# Patient Record
Sex: MALE | Race: WHITE
[De-identification: names, ages, dates, MRNs, and addresses within clinical notes are randomized per-mention and may not be internally consistent; named-entity substitution may affect disease eponyms.]

---

## 2018-10-12 ENCOUNTER — HOSPITAL ENCOUNTER (EMERGENCY)
Dept: HOSPITAL 61 - ER | Age: 43
Discharge: HOME | End: 2018-10-12
Payer: COMMERCIAL

## 2018-10-12 VITALS — BODY MASS INDEX: 22.9 KG/M2 | HEIGHT: 70 IN | WEIGHT: 160 LBS

## 2018-10-12 VITALS — SYSTOLIC BLOOD PRESSURE: 159 MMHG | DIASTOLIC BLOOD PRESSURE: 80 MMHG

## 2018-10-12 DIAGNOSIS — M79.10: ICD-10-CM

## 2018-10-12 DIAGNOSIS — Z87.442: ICD-10-CM

## 2018-10-12 DIAGNOSIS — R68.83: Primary | ICD-10-CM

## 2018-10-12 DIAGNOSIS — R05: ICD-10-CM

## 2018-10-12 DIAGNOSIS — R51: ICD-10-CM

## 2018-10-12 DIAGNOSIS — E10.9: ICD-10-CM

## 2018-10-12 DIAGNOSIS — R11.0: ICD-10-CM

## 2018-10-12 LAB
INFLUENZA A PATIENT: NEGATIVE
INFLUENZA B PATIENT: NEGATIVE

## 2018-10-12 PROCEDURE — 87804 INFLUENZA ASSAY W/OPTIC: CPT

## 2018-10-12 PROCEDURE — 99284 EMERGENCY DEPT VISIT MOD MDM: CPT

## 2018-10-12 PROCEDURE — 82962 GLUCOSE BLOOD TEST: CPT

## 2018-10-12 NOTE — PHYS DOC
Past Medical History


Past Medical History:  Diabetes-Type I, Kidney Stone


Past Surgical History:  Other


Additional Past Surgical Histo:  knee, shoulder


Alcohol Use:  None


Drug Use:  None





Adult General


Chief Complaint


Chief Complaint:  FLU SYMPTOM





HPI


HPI





43-year-old male presents to ER with complaints of 3 day history of generalized 

body aches, chills, nausea, and nonproductive cough. Patient denies any other 

family members being ill or been around ill people in the past 1-2 weeks. 

Patient denies any recent travel. Patient reports he is type I diabetic denying 

any polyuria or polydipsia. Patient's blood sugar is currently 155. Patient 

reports he has been taking ibuprofen 400 mg with last dose at 10:15 this 

morning with some improvement in symptoms. He has had slight decrease in 

appetite but has been drinking water. Patient denies any urinary symptoms.





Review of Systems


Review of Systems





Constitutional: Denies fever. Ports chills and generalized body aches


Eyes: Denies change in visual acuity, redness, or eye pain []


HENT: Denies nasal congestion or sore throat []


Respiratory: Denies shortness of breath. Reports intermittent nonproductive 

cough


Cardiovascular: Denies chest pain or palpitations


GI: Denies abdominal pain, vomiting, bloody stools or diarrhea. Reports 

intermittent nausea


: Denies dysuria or hematuria []


Musculoskeletal: Denies back/neck pain or joint pain []


Integument: Denies rash, swelling, or skin lesions []


Neurologic: Denies focal weakness or sensory changes. Reports mild headache 

denies dizziness or lightheadedness


Endocrine: Denies polyuria or polydipsia []





All other systems were reviewed and found to be within normal limits, except as 

documented in this note.





Current Medications


Current Medications





Current Medications








 Medications


  (Trade)  Dose


 Ordered  Sig/[facility]  Start Time


 Stop Time Status Last Admin


Dose Admin


 


 Acetaminophen


  (Tylenol)  1,000 mg  1X  ONCE  10/12/18 13:00


 10/12/18 13:01 DC 10/12/18 12:53


1,000 MG











Allergies


Allergies





Allergies








Coded Allergies Type Severity Reaction Last Updated Verified


 


  No Known Drug Allergies    10/12/18 No











Physical Exam


Physical Exam





Constitutional: Well developed, well nourished, no acute distress, non-toxic 

appearance. []


HENT: Normocephalic, atraumatic, bilateral ears normal, mucous membranes pink/

moist, no oral exudates, nose normal. []


Eyes: PERRLA, no nystagmus, conjunctiva normal, no discharge. [] 


Neck: Normal range of motion, no tenderness, supple, no gross adenopathy


Cardiovascular:Heart rate regular rhythm, no murmur []


Lungs & Thorax:  Bilateral breath sounds clear to auscultation. Resp. equal/

nonlabored


Abdomen: Bowel sounds normal, soft, no tenderness, no masses, no pulsatile 

masses. [] 


Skin: Warm, dry, no erythema, no rash. [] 


Back: No tenderness, no CVA tenderness. [] 


Extremities: No tenderness, no cyanosis, no clubbing, ROM intact, no edema. [] 


Neurologic: Alert and oriented X 3, normal motor function, normal sensory 

function, no focal deficits noted. []


Psychologic: Affect normal, judgement normal, mood normal. []





Current Patient Data


Vital Signs





 Vital Signs








  Date Time  Temp Pulse Resp B/P (MAP) Pulse Ox O2 Delivery O2 Flow Rate FiO2


 


10/12/18 12:34 98.4 94 20 159/80 (106) 97 Room Air  





 98.4       








Lab Values





 Laboratory Tests








Test


 10/12/18


12:25 10/12/18


12:31


 


Influenza Type A Antigen


 Negative


(NEGATIVE) 





 


Influenza Type B Antigen


 Negative


(NEGATIVE) 





 


Glucose (Fingerstick)


 


 155 mg/dL


(70-99)  H











EKG


EKG


[]





Radiology/Procedures


Radiology/Procedures


[]





Course & Med Decision Making


Course & Med Decision Making


Pertinent Labs studies reviewed. (See chart for details)





Discussed negative influenza test with patient. Patient was given gram of 

Tylenol while in the ER. Recheck of temperature he has 98.3 as he reports he is 

feeling warmer than he did at time of arrival. Patient remains nontoxic in 

appearance and in no visible distress. Discussed plans for home discharge and 

education regarding viral type illness with options for over-the-counter 

treatment including Tylenol and/or ibuprofen. Patient encouraged to increase 

fluid intake, eat well-balanced meal, and get plenty of rest. Will provide 

community clinic and physician referral information for follow-up purposes. 

Education provided on signs and symptoms to return to ER for an discharge 

instructions were discussed. Pt encouraged to continue monitoring his BS at 

home along with ketones with any V/D or change in condition.





Dragon Disclaimer


Dragon Disclaimer


This electronic medical record was generated, in whole or in part, using a 

voice recognition dictation system.





Departure


Departure


Impression:  


 Primary Impression:  


 Flu-like symptoms


Disposition:  01 HOME, SELF-CARE


Condition:  STABLE


Patient Instructions:  Viral Syndrome





Additional Instructions:  


Your symptoms are probably due to viral illness and is flu like in nature. If 

symptoms worsen follow-up with primary doctor in next 2-3 days. 





Plenty of fluids, well balanced meal, and get plenty of rest.





Tylenol and/or ibuprofen as directed on container as needed.











JAMEE GLOVER Oct 12, 2018 13:02

## 2018-10-13 ENCOUNTER — HOSPITAL ENCOUNTER (EMERGENCY)
Dept: HOSPITAL 61 - ER | Age: 43
Discharge: HOME | End: 2018-10-13
Payer: COMMERCIAL

## 2018-10-13 VITALS — BODY MASS INDEX: 23.62 KG/M2 | HEIGHT: 70 IN | WEIGHT: 165 LBS

## 2018-10-13 VITALS — SYSTOLIC BLOOD PRESSURE: 170 MMHG | DIASTOLIC BLOOD PRESSURE: 82 MMHG

## 2018-10-13 DIAGNOSIS — L02.31: Primary | ICD-10-CM

## 2018-10-13 DIAGNOSIS — Z98.52: ICD-10-CM

## 2018-10-13 DIAGNOSIS — Z87.442: ICD-10-CM

## 2018-10-13 DIAGNOSIS — E10.9: ICD-10-CM

## 2018-10-13 PROCEDURE — 96372 THER/PROPH/DIAG INJ SC/IM: CPT

## 2018-10-13 PROCEDURE — 99284 EMERGENCY DEPT VISIT MOD MDM: CPT

## 2018-10-13 NOTE — PHYS DOC
Past Medical History


Past Medical History:  Diabetes-Type I, Kidney Stone


Past Surgical History:  Other


Additional Past Surgical Histo:  knee, shoulder; vasectomy


Alcohol Use:  None


Drug Use:  None





Adult General


Chief Complaint


Chief Complaint:  ABSCESS





HPI


HPI





Patient is a 43  year old male who presents with an abscess to his buttock 1 

day. The she was seen at this facility yesterday for flulike symptoms. He was 

not found to have influenza. He states that after he left the hospital he went 

home and was having some constipation issues. He states that when he sat down 

on the toilet he discovered that he had the painful swelling to his right 

buttock. He denies any history of abscess. He states that he has had a headache 

today but denies fever, nausea or vomiting.





Review of Systems


Review of Systems





Constitutional: Denies fever or chills []


Respiratory: Denies cough or shortness of breath []


Cardiovascular: No additional information not addressed in HPI []


GI: Denies abdominal pain, nausea, vomiting, bloody stools or diarrhea []


: Denies dysuria or hematuria []


Musculoskeletal: Denies back pain or joint pain []


Integument: See history of present illness


Neurologic: Denies headache, focal weakness or sensory changes []


Endocrine: Denies polyuria or polydipsia []





All other systems were reviewed and found to be within normal limits, except as 

documented in this note.





Current Medications


Current Medications





Current Medications








 Medications


  (Trade)  Dose


 Ordered  Sig/[facility]  Start Time


 Stop Time Status Last Admin


Dose Admin


 


 Acetaminophen/


 Hydrocodone Bitart


  (Lortab 5/325)  1 tab  1X  ONCE  10/13/18 14:15


 10/13/18 14:16 DC 10/13/18 14:32


1 TAB


 


 Ceftriaxone Sodium


  (Rocephin Im)  1 gm  STK-MED ONCE  10/13/18 14:43


 10/13/18 14:44 DC  





 


 Lidocaine/Sodium


 Bicarbonate


  (Buffered


 Lidocaine 1%)  3 ml  1X  ONCE  10/13/18 14:15


 10/13/18 14:16 DC 10/13/18 14:32


3 ML











Allergies


Allergies





Allergies








Coded Allergies Type Severity Reaction Last Updated Verified


 


  No Known Drug Allergies    10/12/18 No











Physical Exam


Physical Exam





Constitutional: Well developed, well nourished, no acute distress, non-toxic 

appearance. []


Cardiovascular:Heart rate regular rhythm, no murmur []


Lungs & Thorax:  Bilateral breath sounds clear to auscultation []


Abdomen: Bowel sounds normal, soft, no tenderness, no masses, no pulsatile 

masses. [] 


Skin: There is a firm erythematous abscess to the lower 8 gluteal fold with 

induration but no fluctuance noted


Back: No tenderness, no CVA tenderness. [] 


Extremities: No tenderness, no cyanosis, no clubbing, ROM intact, no edema. [] 


Neurologic: Alert and oriented X 3, normal motor function, normal sensory 

function, no focal deficits noted. []


Psychologic: Affect normal, judgement normal, mood normal. []





Current Patient Data


Vital Signs





 Vital Signs








  Date Time  Temp Pulse Resp B/P (MAP) Pulse Ox O2 Delivery O2 Flow Rate FiO2


 


10/13/18 14:32   16  99 Room Air  


 


10/13/18 14:12 98.1 95  170/82 (111)    





 98.1       











EKG


EKG


[]





Radiology/Procedures


Radiology/Procedures


[]





Course & Med Decision Making


Course & Med Decision Making


Pertinent Labs and Imaging studies reviewed. (See chart for details)





[]The patient is being placed on antibiotics. He is to use hot compresses 

multiple times daily. He is in agreement with this plan. He was given a gram of 

Rocephin in the emergency department as well. He is to return immediately if 

worsening.





Dragon Disclaimer


Dragon Disclaimer


This electronic medical record was generated, in whole or in part, using a 

voice recognition dictation system.





Departure


Departure


Impression:  


 Primary Impression:  


 Abscess


Disposition:  01 HOME, SELF-CARE


Condition:  STABLE


Referrals:  


NO PCP (PCP)


Patient Instructions:  Abscess





Additional Instructions:  


Take the medication as directed. Do not drive or operate heavy machinery while 

taking pain medication. Follow-up with primary care in 3 days if not improving 

or return to the emergency department if worsening. Use hot compresses multiple 

times daily to the abscess.


Scripts


Hydrocodone/Apap 5-325 (NORCO 5-325 TABLET) 1 Each Tablet


1 TAB PO PRN Q6HRS PRN for PAIN, #14 TAB 0 Refills


   Prov: TAVON SEYMOUR         10/13/18 


Sulfamethoxazole/Trimethoprim (BACTRIM DS TABLET) 1 Each Tablet


1 TAB PO BID, #20 TAB


   Prov: TAVON SEYMOUR         10/13/18











TAVON SEYMOUR Oct 13, 2018 14:44

## 2018-10-14 ENCOUNTER — HOSPITAL ENCOUNTER (INPATIENT)
Dept: HOSPITAL 61 - ER | Age: 43
LOS: 7 days | Discharge: HOME | DRG: 854 | End: 2018-10-21
Attending: INTERNAL MEDICINE | Admitting: INTERNAL MEDICINE
Payer: COMMERCIAL

## 2018-10-14 VITALS — HEIGHT: 70 IN | BODY MASS INDEX: 23.62 KG/M2 | WEIGHT: 165 LBS

## 2018-10-14 VITALS — DIASTOLIC BLOOD PRESSURE: 78 MMHG | SYSTOLIC BLOOD PRESSURE: 148 MMHG

## 2018-10-14 VITALS — DIASTOLIC BLOOD PRESSURE: 79 MMHG | SYSTOLIC BLOOD PRESSURE: 145 MMHG

## 2018-10-14 VITALS — SYSTOLIC BLOOD PRESSURE: 138 MMHG | DIASTOLIC BLOOD PRESSURE: 73 MMHG

## 2018-10-14 DIAGNOSIS — E11.65: ICD-10-CM

## 2018-10-14 DIAGNOSIS — Z87.442: ICD-10-CM

## 2018-10-14 DIAGNOSIS — Z83.3: ICD-10-CM

## 2018-10-14 DIAGNOSIS — N39.0: ICD-10-CM

## 2018-10-14 DIAGNOSIS — K62.89: ICD-10-CM

## 2018-10-14 DIAGNOSIS — A41.01: Primary | ICD-10-CM

## 2018-10-14 DIAGNOSIS — Z79.899: ICD-10-CM

## 2018-10-14 DIAGNOSIS — I10: ICD-10-CM

## 2018-10-14 DIAGNOSIS — N20.0: ICD-10-CM

## 2018-10-14 DIAGNOSIS — Z79.4: ICD-10-CM

## 2018-10-14 DIAGNOSIS — Z82.49: ICD-10-CM

## 2018-10-14 DIAGNOSIS — L03.315: ICD-10-CM

## 2018-10-14 DIAGNOSIS — K61.1: ICD-10-CM

## 2018-10-14 DIAGNOSIS — R74.0: ICD-10-CM

## 2018-10-14 DIAGNOSIS — Z87.440: ICD-10-CM

## 2018-10-14 DIAGNOSIS — E11.649: ICD-10-CM

## 2018-10-14 LAB
% BANDS: 4 % (ref 0–9)
% LYMPHS: 9 % (ref 24–48)
% MONOS: 5 % (ref 0–10)
% SEGS: 80 % (ref 35–66)
ALBUMIN SERPL-MCNC: 3.2 G/DL (ref 3.4–5)
ALBUMIN/GLOB SERPL: 0.8 {RATIO} (ref 1–1.7)
ALP SERPL-CCNC: 101 U/L (ref 46–116)
ALT SERPL-CCNC: 38 U/L (ref 16–63)
ANION GAP SERPL CALC-SCNC: 11 MMOL/L (ref 6–14)
AST SERPL-CCNC: 28 U/L (ref 15–37)
BASOPHILS # BLD AUTO: 0.1 X10^3/UL (ref 0–0.2)
BASOPHILS NFR BLD: 0 % (ref 0–3)
BILIRUB SERPL-MCNC: 0.6 MG/DL (ref 0.2–1)
BUN SERPL-MCNC: 14 MG/DL (ref 8–26)
BUN/CREAT SERPL: 12 (ref 6–20)
CALCIUM SERPL-MCNC: 9.5 MG/DL (ref 8.5–10.1)
CHLORIDE SERPL-SCNC: 99 MMOL/L (ref 98–107)
CO2 SERPL-SCNC: 28 MMOL/L (ref 21–32)
CREAT SERPL-MCNC: 1.2 MG/DL (ref 0.7–1.3)
EOSINOPHIL NFR BLD AUTO: 2 % (ref 0–5)
EOSINOPHIL NFR BLD: 0.2 X10^3/UL (ref 0–0.7)
EOSINOPHIL NFR BLD: 1 % (ref 0–3)
ERYTHROCYTE [DISTWIDTH] IN BLOOD BY AUTOMATED COUNT: 13.1 % (ref 11.5–14.5)
GFR SERPLBLD BASED ON 1.73 SQ M-ARVRAT: 65.8 ML/MIN
GLOBULIN SER-MCNC: 4.2 G/DL (ref 2.2–3.8)
GLUCOSE SERPL-MCNC: 224 MG/DL (ref 70–99)
HCT VFR BLD CALC: 37.7 % (ref 39–53)
HGB BLD-MCNC: 12.8 G/DL (ref 13–17.5)
LYMPHOCYTES # BLD: 1.8 X10^3/UL (ref 1–4.8)
LYMPHOCYTES NFR BLD AUTO: 8 % (ref 24–48)
MCH RBC QN AUTO: 30 PG (ref 25–35)
MCHC RBC AUTO-ENTMCNC: 34 G/DL (ref 31–37)
MCV RBC AUTO: 88 FL (ref 79–100)
MONO #: 2.6 X10^3/UL (ref 0–1.1)
MONOCYTES NFR BLD: 12 % (ref 0–9)
NEUT #: 17.4 X10^3UL (ref 1.8–7.7)
NEUTROPHILS NFR BLD AUTO: 79 % (ref 31–73)
PLATELET # BLD AUTO: 264 X10^3/UL (ref 140–400)
PLATELET # BLD EST: ADEQUATE 10*3/UL
POTASSIUM SERPL-SCNC: 3.7 MMOL/L (ref 3.5–5.1)
PROT SERPL-MCNC: 7.4 G/DL (ref 6.4–8.2)
RBC # BLD AUTO: 4.28 X10^6/UL (ref 4.3–5.7)
SODIUM SERPL-SCNC: 138 MMOL/L (ref 136–145)
WBC # BLD AUTO: 22.1 X10^3/UL (ref 4–11)

## 2018-10-14 PROCEDURE — 87071 CULTURE AEROBIC QUANT OTHER: CPT

## 2018-10-14 PROCEDURE — 96368 THER/DIAG CONCURRENT INF: CPT

## 2018-10-14 PROCEDURE — 85007 BL SMEAR W/DIFF WBC COUNT: CPT

## 2018-10-14 PROCEDURE — 83605 ASSAY OF LACTIC ACID: CPT

## 2018-10-14 PROCEDURE — 96365 THER/PROPH/DIAG IV INF INIT: CPT

## 2018-10-14 PROCEDURE — 96375 TX/PRO/DX INJ NEW DRUG ADDON: CPT

## 2018-10-14 PROCEDURE — A4461 SURGICL DRESS HOLD NON-REUSE: HCPCS

## 2018-10-14 PROCEDURE — 80053 COMPREHEN METABOLIC PANEL: CPT

## 2018-10-14 PROCEDURE — 82962 GLUCOSE BLOOD TEST: CPT

## 2018-10-14 PROCEDURE — 36415 COLL VENOUS BLD VENIPUNCTURE: CPT

## 2018-10-14 PROCEDURE — 87075 CULTR BACTERIA EXCEPT BLOOD: CPT

## 2018-10-14 PROCEDURE — 87040 BLOOD CULTURE FOR BACTERIA: CPT

## 2018-10-14 PROCEDURE — 83036 HEMOGLOBIN GLYCOSYLATED A1C: CPT

## 2018-10-14 PROCEDURE — 85651 RBC SED RATE NONAUTOMATED: CPT

## 2018-10-14 PROCEDURE — 76857 US EXAM PELVIC LIMITED: CPT

## 2018-10-14 PROCEDURE — 96366 THER/PROPH/DIAG IV INF ADDON: CPT

## 2018-10-14 PROCEDURE — 74170 CT ABD WO CNTRST FLWD CNTRST: CPT

## 2018-10-14 PROCEDURE — 71045 X-RAY EXAM CHEST 1 VIEW: CPT

## 2018-10-14 PROCEDURE — 87186 SC STD MICRODIL/AGAR DIL: CPT

## 2018-10-14 PROCEDURE — 80048 BASIC METABOLIC PNL TOTAL CA: CPT

## 2018-10-14 PROCEDURE — 85025 COMPLETE CBC W/AUTO DIFF WBC: CPT

## 2018-10-14 RX ADMIN — CELECOXIB SCH MG: 100 CAPSULE ORAL at 20:54

## 2018-10-14 RX ADMIN — FENTANYL CITRATE PRN MCG: 50 INJECTION INTRAMUSCULAR; INTRAVENOUS at 11:09

## 2018-10-14 RX ADMIN — FENTANYL CITRATE PRN MCG: 50 INJECTION INTRAMUSCULAR; INTRAVENOUS at 14:14

## 2018-10-14 RX ADMIN — INSULIN LISPRO SCH UNITS: 100 INJECTION, SOLUTION INTRAVENOUS; SUBCUTANEOUS at 17:58

## 2018-10-14 RX ADMIN — FENTANYL CITRATE PRN MCG: 50 INJECTION INTRAMUSCULAR; INTRAVENOUS at 13:25

## 2018-10-14 RX ADMIN — HYDROCODONE BITARTRATE AND ACETAMINOPHEN PRN TAB: 5; 325 TABLET ORAL at 20:53

## 2018-10-14 RX ADMIN — BACITRACIN SCH MLS/HR: 5000 INJECTION, POWDER, FOR SOLUTION INTRAMUSCULAR at 20:54

## 2018-10-14 RX ADMIN — INSULIN LISPRO SCH UNITS: 100 INJECTION, SOLUTION INTRAVENOUS; SUBCUTANEOUS at 17:00

## 2018-10-14 RX ADMIN — ACETAMINOPHEN PRN MG: 325 TABLET, FILM COATED ORAL at 23:12

## 2018-10-14 RX ADMIN — BACITRACIN SCH MLS/HR: 5000 INJECTION, POWDER, FOR SOLUTION INTRAMUSCULAR at 17:02

## 2018-10-14 RX ADMIN — FENTANYL CITRATE PRN MCG: 50 INJECTION INTRAMUSCULAR; INTRAVENOUS at 10:47

## 2018-10-14 RX ADMIN — DOCUSATE SODIUM SCH MG: 100 CAPSULE, LIQUID FILLED ORAL at 21:29

## 2018-10-14 RX ADMIN — HYDROCODONE BITARTRATE AND ACETAMINOPHEN PRN TAB: 5; 325 TABLET ORAL at 16:48

## 2018-10-14 RX ADMIN — POLYETHYLENE GLYCOL 3350 SCH GM: 17 POWDER, FOR SOLUTION ORAL at 21:29

## 2018-10-14 RX ADMIN — CLINDAMYCIN IN 5 PERCENT DEXTROSE SCH MLS/HR: 12 INJECTION, SOLUTION INTRAVENOUS at 21:30

## 2018-10-14 NOTE — PDOC1
History and Physical


Date of Admission


Date of Admission


DATE: 10/14/18 


TIME: 12:55





Identification/Chief Complaint


Chief Complaint


Groin pain/perineal pain





Source


Source:  Caregiver, Chart review, Patient





History of Present Illness


History of Present Illness


43-year-old  male, diabetic with unknown hemoglobin A1c on insulin 

regimen at home, this is his third visit at the emergency room for perineal 

swelling and pain. His first visit was 2 days prior to admission, discharge 

appropriately on Bactrim and hydrocodone. He came back one day prior to 

admission with the same symptoms. Sent home appropriately. And now back again 

with a white count of 22,000, hemoglobin 12. No fevers. I did inspect the 

perineal area. Induration with skin intact mostly on the left perineal area 

most appreciable on the posterior side  than the anterior side. Imaging shows 

no signs of fourniers gangrene or abscess collection. But there is significant 

induration on clinical examination on imaging. Hence I agree with admission for 

IV antibiotics. Will add a sedimentation rate. Resume home insulin regimen, 

check hemoglobin A1c, follow blood cultures and go from there. Can discharged 

safely home on by mouth antibiotics once induration and swelling has come down 

significantly and white count is at a much decent level.


He has never had similar problems before





Past Medical History


Endocrine:  Diabetes





Past Surgical History


Past Surgical History:  Other (shoulder surgery, lithotripsy, vasectomy)





Family History


Family History:  Diabetes, Heart Disease, High Cholestrol, Hypertension





Social History


Smoke:  No


ALCOHOL:  none


Drugs:  None





Current Problem List


Problem List


Problems


Medical Problems:


(1) Cellulitis


Status: Acute  











Current Medications


Current Medications





Current Medications


Fentanyl Citrate (Fentanyl 2ml Vial) 25 mcg PRN Q15MIN  PRN IV PAIN GREATER 

THAN 3/10 Last administered on 10/14/18at 11:09;  Start 10/14/18 at 10:30;  

Stop 10/15/18 at 10:29


Sodium Chloride 1,000 ml @  1,000 mls/hr Q1H IV  Last administered on 10/14/

18at 10:39;  Start 10/14/18 at 10:22;  Stop 10/14/18 at 11:21;  Status DC


Ondansetron HCl (Zofran) 4 mg 1X  ONCE IV  Last administered on 10/14/18at 10:45

;  Start 10/14/18 at 11:00;  Stop 10/14/18 at 11:01;  Status DC


Vancomycin HCl 250 ml @  250 mls/hr 1X  ONCE IV ;  Start 10/14/18 at 10:30;  

Stop 10/14/18 at 10:31;  Status DC


Piperacillin Sod/ Tazobactam Sod 3.375 gm/Sodium Chloride 50 ml @  100 mls/hr 

1X  ONCE IV  Last administered on 10/14/18at 11:04;  Start 10/14/18 at 11:00;  

Stop 10/14/18 at 11:29;  Status DC


Vancomycin HCl 1.75 gm/Sodium Chloride 500 ml @  250 mls/hr 1X  ONCE IV  Last 

administered on 10/14/18at 11:05;  Start 10/14/18 at 11:30;  Stop 10/14/18 at 13

:29


Iohexol (Omnipaque 300 Mg/ml) 75 ml 1X  ONCE IV ;  Start 10/14/18 at 11:45;  

Stop 10/14/18 at 11:48;  Status DC


Info (CONTRAST GIVEN -- Rx MONITORING) 1 each PRN DAILY  PRN MC SEE COMMENTS;  

Start 10/14/18 at 12:00;  Stop 10/16/18 at 11:59


Ondansetron HCl (Zofran) 4 mg PRN Q8HRS  PRN IV NAUSEA/VOMITING;  Start 10/14/

18 at 12:45;  Stop 10/15/18 at 12:44


Morphine Sulfate (Morphine Sulfate) 4 mg PRN Q2HR  PRN IV PAIN;  Start 10/14/18 

at 12:45;  Stop 10/15/18 at 12:44


Sodium Chloride 1,000 ml @  125 mls/hr Q8H IV ;  Start 10/14/18 at 12:32;  Stop 

10/15/18 at 12:31





Active Scripts


Active


Norco 5-325 Tablet (Acetaminophen/Hydrocodone Bitart) 1 Each Tablet 1 Tab PO 

PRN Q6HRS PRN


Bactrim Ds Tablet (Sulfamethoxazole/Trimethoprim) 1 Each Tablet 1 Tab PO BID





Allergies


Allergies:  


Coded Allergies:  


     No Known Drug Allergies (Unverified , 10/12/18)





ROS


Review of System


As per history of present illness, the rest of ROS 14 point negative





Physical Exam


General:  Alert, Oriented X3, Cooperative, No acute distress


HEENT:  Atraumatic, PERRLA, EOMI


Lungs:  Clear to auscultation, Normal air movement


Heart:  S1S2, RRR, no thrills, no rubs, no gallops


Cardiovascular:  S1, S2


Abdomen:  Normal bowel sounds, Soft, No tenderness, No hepatosplenomegaly, No 

masses


Male Genitals Exam:  other (he has significant induration and redness tender to 

touch on the left posterior perineal area most appreciable posterior approach 

rather than anterior inspection, no skin break, no foul-smelling discharge)


Rectal Exam:  not examined


Extremities:  No clubbing, No cyanosis, No edema, Normal pulses, No tenderness/

swelling


Neuro:  Normal gait, Normal speech, Strength at 5/5 X4 ext, Normal tone, 

Sensation intact, Cranial nerves 3-12 NL, Reflexes 2+


Psych/Mental Status:  Mental status NL, Mood NL





Vitals


Vitals





Vital Signs








  Date Time  Temp Pulse Resp B/P (MAP) Pulse Ox O2 Delivery O2 Flow Rate FiO2


 


10/14/18 11:39   16  98   


 


10/14/18 10:00 98.8 107  165/79 (107)  Room Air  





 98.8       











Labs


Labs





Laboratory Tests








Test


 10/14/18


10:30


 


White Blood Count


 22.1 x10^3/uL


(4.0-11.0)


 


Red Blood Count


 4.28 x10^6/uL


(4.30-5.70)


 


Hemoglobin


 12.8 g/dL


(13.0-17.5)


 


Hematocrit


 37.7 %


(39.0-53.0)


 


Mean Corpuscular Volume 88 fL () 


 


Mean Corpuscular Hemoglobin 30 pg (25-35) 


 


Mean Corpuscular Hemoglobin


Concent 34 g/dL


(31-37)


 


Red Cell Distribution Width


 13.1 %


(11.5-14.5)


 


Platelet Count


 264 x10^3/uL


(140-400)


 


Neutrophils (%) (Auto) 79 % (31-73) 


 


Lymphocytes (%) (Auto) 8 % (24-48) 


 


Monocytes (%) (Auto) 12 % (0-9) 


 


Eosinophils (%) (Auto) 1 % (0-3) 


 


Basophils (%) (Auto) 0 % (0-3) 


 


Neutrophils # (Auto)


 17.4 x10^3uL


(1.8-7.7)


 


Lymphocytes # (Auto)


 1.8 x10^3/uL


(1.0-4.8)


 


Monocytes # (Auto)


 2.6 x10^3/uL


(0.0-1.1)


 


Eosinophils # (Auto)


 0.2 x10^3/uL


(0.0-0.7)


 


Basophils # (Auto)


 0.1 x10^3/uL


(0.0-0.2)


 


Segmented Neutrophils % 80 % (35-66) 


 


Band Neutrophils % 4 % (0-9) 


 


Lymphocytes % 9 % (24-48) 


 


Monocytes % 5 % (0-10) 


 


Eosinophils % 2 % (0-5) 


 


Platelet Estimate


 Adequate


(ADEQUATE)


 


Sodium Level


 138 mmol/L


(136-145)


 


Potassium Level


 3.7 mmol/L


(3.5-5.1)


 


Chloride Level


 99 mmol/L


()


 


Carbon Dioxide Level


 28 mmol/L


(21-32)


 


Anion Gap 11 (6-14) 


 


Blood Urea Nitrogen


 14 mg/dL


(8-26)


 


Creatinine


 1.2 mg/dL


(0.7-1.3)


 


Estimated GFR


(Cockcroft-Gault) 65.8 





 


BUN/Creatinine Ratio 12 (6-20) 


 


Glucose Level


 224 mg/dL


(70-99)


 


Lactic Acid Level


 1.2 mmol/L


(0.4-2.0)


 


Calcium Level


 9.5 mg/dL


(8.5-10.1)


 


Total Bilirubin


 0.6 mg/dL


(0.2-1.0)


 


Aspartate Amino Transf


(AST/SGOT) 28 U/L (15-37) 





 


Alanine Aminotransferase


(ALT/SGPT) 38 U/L (16-63) 





 


Alkaline Phosphatase


 101 U/L


()


 


Total Protein


 7.4 g/dL


(6.4-8.2)


 


Albumin


 3.2 g/dL


(3.4-5.0)


 


Albumin/Globulin Ratio 0.8 (1.0-1.7) 








Laboratory Tests








Test


 10/14/18


10:30


 


White Blood Count


 22.1 x10^3/uL


(4.0-11.0)


 


Red Blood Count


 4.28 x10^6/uL


(4.30-5.70)


 


Hemoglobin


 12.8 g/dL


(13.0-17.5)


 


Hematocrit


 37.7 %


(39.0-53.0)


 


Mean Corpuscular Volume 88 fL () 


 


Mean Corpuscular Hemoglobin 30 pg (25-35) 


 


Mean Corpuscular Hemoglobin


Concent 34 g/dL


(31-37)


 


Red Cell Distribution Width


 13.1 %


(11.5-14.5)


 


Platelet Count


 264 x10^3/uL


(140-400)


 


Neutrophils (%) (Auto) 79 % (31-73) 


 


Lymphocytes (%) (Auto) 8 % (24-48) 


 


Monocytes (%) (Auto) 12 % (0-9) 


 


Eosinophils (%) (Auto) 1 % (0-3) 


 


Basophils (%) (Auto) 0 % (0-3) 


 


Neutrophils # (Auto)


 17.4 x10^3uL


(1.8-7.7)


 


Lymphocytes # (Auto)


 1.8 x10^3/uL


(1.0-4.8)


 


Monocytes # (Auto)


 2.6 x10^3/uL


(0.0-1.1)


 


Eosinophils # (Auto)


 0.2 x10^3/uL


(0.0-0.7)


 


Basophils # (Auto)


 0.1 x10^3/uL


(0.0-0.2)


 


Segmented Neutrophils % 80 % (35-66) 


 


Band Neutrophils % 4 % (0-9) 


 


Lymphocytes % 9 % (24-48) 


 


Monocytes % 5 % (0-10) 


 


Eosinophils % 2 % (0-5) 


 


Platelet Estimate


 Adequate


(ADEQUATE)


 


Sodium Level


 138 mmol/L


(136-145)


 


Potassium Level


 3.7 mmol/L


(3.5-5.1)


 


Chloride Level


 99 mmol/L


()


 


Carbon Dioxide Level


 28 mmol/L


(21-32)


 


Anion Gap 11 (6-14) 


 


Blood Urea Nitrogen


 14 mg/dL


(8-26)


 


Creatinine


 1.2 mg/dL


(0.7-1.3)


 


Estimated GFR


(Cockcroft-Gault) 65.8 





 


BUN/Creatinine Ratio 12 (6-20) 


 


Glucose Level


 224 mg/dL


(70-99)


 


Lactic Acid Level


 1.2 mmol/L


(0.4-2.0)


 


Calcium Level


 9.5 mg/dL


(8.5-10.1)


 


Total Bilirubin


 0.6 mg/dL


(0.2-1.0)


 


Aspartate Amino Transf


(AST/SGOT) 28 U/L (15-37) 





 


Alanine Aminotransferase


(ALT/SGPT) 38 U/L (16-63) 





 


Alkaline Phosphatase


 101 U/L


()


 


Total Protein


 7.4 g/dL


(6.4-8.2)


 


Albumin


 3.2 g/dL


(3.4-5.0)


 


Albumin/Globulin Ratio 0.8 (1.0-1.7) 











VTE Prophylaxis Ordered


VTE Prophylaxis Devices:  Yes


VTE Pharmacological Prophylaxi:  Yes





Assessment/Plan


Assessment/Plan


Left perineal cellulitis-no evidence clinically of yocasta's gangrene-no 

evidence of abscess


Sepsis POA


Diabetes type 2 with unknown hemoglobin A1c - on insulin





Plan: Agree with IV abx


Follow blood cultures drawn at the ER prior to antibiotics


Check a sedimentation rate


Check hemoglobin A1c


No need for ID consult


Reconcile home meds namely Levemir 18 daily at bedtime and NovoLog 15 3 times a 

day with high-dose sliding scale insulin before meals at bedtime


Seen at ER











GENARO CUADRA MD Oct 14, 2018 13:01

## 2018-10-14 NOTE — RAD
PQRS Compliance Statement:

 

One or more of the following individualized dose reduction techniques were

utilized for this examination:  

1. Automated exposure control  

2. Adjustment of the mA and/or kV according to patient size  

3. Use of iterative reconstruction technique

 

 

CT PELVIS W/CONTRAST

 

Clinical Indication: perirectal abscess; r/o Raulito gangrene

 

Comparison: CT abdomen and pelvis without contrast, July 23, 2009.

 

TECHNIQUE: Helical CT imaging of the pelvis is performed after 75 cc of 

Omnipaque 300 IV contrast.

 

Findings: 

There is no bowel obstruction. The appendix is normal. Urinary bladder is 

normal. Prostate size normal. Coarse calcification anterior to the 

inferior prostate is stable. Bilateral inguinal lymph nodes may be 

reactive. There are vasectomy clips. Probable small bilateral hydroceles.

 

No scrotal or perineal subcutaneous air is identified. A perirectal fluid 

collection is not identified. There is left perineal and inferior left 

gluteal moderate subcutaneous induration. Centered in the induration there

is more confluent soft tissue density measuring approximately 3.6 cm AP 

2.6 cm transverse. The soft tissue density may represent phlegmon. An 

organized fluid collection is not seen to suggest abscess.

 

IMPRESSION:

1.  There is left perineal and inferior left gluteal moderate subcutaneous

induration. There is overlying skin thickening. Findings suggest 

cellulitis. There is no subcutaneous air to suggest Raulito's gangrene. 

No organized abscess is seen.

2.  Bilateral inguinal lymph nodes may be reactive.

 

Electronically signed by: Shahram Jacques MD (10/14/2018 12:08 PM) [facility]

## 2018-10-14 NOTE — PHYS DOC
Past Medical History


Past Medical History:  Diabetes-Type I, Kidney Stone


Past Surgical History:  Other


Additional Past Surgical Histo:  knee, shoulder; vasectomy


Alcohol Use:  None


Drug Use:  None





Adult General


Chief Complaint


Chief Complaint:  NAUSEA/VOMITING/DIARRHA





HPI


HPI





Patient is a 43-year-old male who presents with complaint of nausea and 

vomiting. Patient states that he was seen here on Friday and was evaluated for 

possible influenza. He then came again yesterday and was evaluated for a tender 

area on his right buttock. Patient states that symptoms are getting worse. He 

indicates that he is not able to keep anything down. He rates the pain around 

his right buttock at an 8 out of 10 and states the pain is worsened when he 

walks or when he sits down. He indicates that he is not sure whether or not he 

has been running a fever but states that he is woken up at times during the 

night very hot and then later on her way In a pool of sweat. He denies any 

diarrhea.





Review of Systems


Review of Systems





Constitutional: Positive subjective fever and chills[]


Respiratory: Denies cough or shortness of breath []


Cardiovascular: Denies chest pain[]


GI: Complains of nausea and vomiting. No abdominal pain or diarrhea[]


Musculoskeletal: Denies back pain or joint pain []


Integument: Complains of tenderness, swelling and pain around the right gluteal 

region extending to his scrotum[]


Neurologic: Denies headache, focal weakness or sensory changes []





All other systems were reviewed and found to be within normal limits, except as 

documented in this note.





Current Medications


Current Medications





Current Medications








 Medications


  (Trade)  Dose


 Ordered  Sig/Nichole  Start Time


 Stop Time Status Last Admin


Dose Admin


 


 Fentanyl Citrate


  (Fentanyl 2ml


 Vial)  25 mcg  PRN Q15MIN  PRN  10/14/18 10:30


 10/15/18 10:29  10/14/18 11:09


25 MCG


 


 Info


  (CONTRAST GIVEN


 -- Rx MONITORING)  1 each  PRN DAILY  PRN  10/14/18 12:00


 10/16/18 11:59   





 


 Iohexol


  (Omnipaque 300


 Mg/ml)  75 ml  1X  ONCE  10/14/18 11:45


 10/14/18 11:48 DC  





 


 Ondansetron HCl


  (Zofran)  4 mg  1X  ONCE  10/14/18 11:00


 10/14/18 11:01 DC 10/14/18 10:45


4 MG


 


 Piperacillin Sod/


 Tazobactam Sod


 3.375 gm/Sodium


 Chloride  50 ml @ 


 100 mls/hr  1X  ONCE  10/14/18 11:00


 10/14/18 11:29 DC 10/14/18 11:04


100 MLS/HR


 


 Sodium Chloride  1,000 ml @ 


 1,000 mls/hr  Q1H  10/14/18 10:22


 10/14/18 11:21 DC 10/14/18 10:39


1,000 MLS/HR


 


 Vancomycin HCl


 1.75 gm/Sodium


 Chloride  500 ml @ 


 250 mls/hr  1X  ONCE  10/14/18 11:30


 10/14/18 13:29  10/14/18 11:05


250 MLS/HR











Allergies


Allergies





Allergies








Coded Allergies Type Severity Reaction Last Updated Verified


 


  No Known Drug Allergies    10/12/18 No











Physical Exam


Physical Exam





Constitutional: Well developed, well nourished, no acute distress, non-toxic 

appearance. []


HENT: Normocephalic, atraumatic, bilateral external ears normal, oropharynx 

moist, no oral exudates, nose normal. []


Eyes: PERRLA, EOMI, conjunctiva normal, no discharge. [] 


Neck: Normal range of motion, no tenderness, supple, no stridor. [] 


Cardiovascular: Tachycardic rate with regular rhythm[]


Lungs & Thorax:  Bilateral breath sounds clear to auscultation []


Abdomen: Bowel sounds normal, soft, with mild epigastric tenderness. [] 


Skin: Right gluteal region in the perirectal area demonstrates approximately 3 

x 5 cm area of intense induration with swelling and tenderness extending to the 

base of the scrotum. There is erythema and warmth.. [] 


Extremities: No tenderness, no cyanosis, no clubbing, ROM intact, no edema. [] 


Neurologic: Alert and oriented X 3, normal motor function, normal sensory 

function, no focal deficits noted. []





Current Patient Data


Vital Signs





 Vital Signs








  Date Time  Temp Pulse Resp B/P (MAP) Pulse Ox O2 Delivery O2 Flow Rate FiO2


 


10/14/18 11:39   16  98   


 


10/14/18 10:00 98.8 107  165/79 (107)  Room Air  





 98.8       








Lab Values





 Laboratory Tests








Test


 10/14/18


10:30


 


White Blood Count


 22.1 x10^3/uL


(4.0-11.0)  H


 


Red Blood Count


 4.28 x10^6/uL


(4.30-5.70)  L


 


Hemoglobin


 12.8 g/dL


(13.0-17.5)  L


 


Hematocrit


 37.7 %


(39.0-53.0)  L


 


Mean Corpuscular Volume


 88 fL ()





 


Mean Corpuscular Hemoglobin 30 pg (25-35)  


 


Mean Corpuscular Hemoglobin


Concent 34 g/dL


(31-37)


 


Red Cell Distribution Width


 13.1 %


(11.5-14.5)


 


Platelet Count


 264 x10^3/uL


(140-400)


 


Neutrophils (%) (Auto) 79 % (31-73)  H


 


Lymphocytes (%) (Auto) 8 % (24-48)  L


 


Monocytes (%) (Auto) 12 % (0-9)  H


 


Eosinophils (%) (Auto) 1 % (0-3)  


 


Basophils (%) (Auto) 0 % (0-3)  


 


Neutrophils # (Auto)


 17.4 x10^3uL


(1.8-7.7)  H


 


Lymphocytes # (Auto)


 1.8 x10^3/uL


(1.0-4.8)


 


Monocytes # (Auto)


 2.6 x10^3/uL


(0.0-1.1)  H


 


Eosinophils # (Auto)


 0.2 x10^3/uL


(0.0-0.7)


 


Basophils # (Auto)


 0.1 x10^3/uL


(0.0-0.2)


 


Segmented Neutrophils % 80 % (35-66)  H


 


Band Neutrophils % 4 % (0-9)  


 


Lymphocytes % 9 % (24-48)  L


 


Monocytes % 5 % (0-10)  


 


Eosinophils % 2 % (0-5)  


 


Platelet Estimate


 Adequate


(ADEQUATE)


 


Sodium Level


 138 mmol/L


(136-145)


 


Potassium Level


 3.7 mmol/L


(3.5-5.1)


 


Chloride Level


 99 mmol/L


()


 


Carbon Dioxide Level


 28 mmol/L


(21-32)


 


Anion Gap 11 (6-14)  


 


Blood Urea Nitrogen


 14 mg/dL


(8-26)


 


Creatinine


 1.2 mg/dL


(0.7-1.3)


 


Estimated GFR


(Cockcroft-Gault) 65.8  





 


BUN/Creatinine Ratio 12 (6-20)  


 


Glucose Level


 224 mg/dL


(70-99)  H


 


Lactic Acid Level


 1.2 mmol/L


(0.4-2.0)


 


Calcium Level


 9.5 mg/dL


(8.5-10.1)


 


Total Bilirubin


 0.6 mg/dL


(0.2-1.0)


 


Aspartate Amino Transferase


(AST) 28 U/L (15-37)





 


Alanine Aminotransferase (ALT)


 38 U/L (16-63)





 


Alkaline Phosphatase


 101 U/L


()


 


Total Protein


 7.4 g/dL


(6.4-8.2)


 


Albumin


 3.2 g/dL


(3.4-5.0)  L


 


Albumin/Globulin Ratio


 0.8 (1.0-1.7)


L





 Laboratory Tests


10/14/18 10:30








 Laboratory Tests


10/14/18 10:30











EKG


EKG


[]





Radiology/Procedures


Radiology/Procedures


[]


Impressions:


CT of the pelvis demonstrates findings of left peroneal in inferior left 

gluteal subcutaneous induration consistent with cellulitis. There are no 

findings to suggest Raulito's gangrene.





Course & Med Decision Making


Course & Med Decision Making


Pertinent Labs and Imaging studies reviewed. (See chart for details)





[]





Dragon Disclaimer


Dragon Disclaimer


This electronic medical record was generated, in whole or in part, using a 

voice recognition dictation system.





Departure


Departure


Impression:  


 Primary Impression:  


 Cellulitis


Disposition:  09 ADMITTED AS INPATIENT


Admitting Physician:  Geovanna Wolf


Condition:  IMPROVED


Referrals:  


NO PCP (PCP)





Problem Qualifiers








 Primary Impression:  


 Cellulitis


 Site of cellulitis:  buttock  Qualified Codes:  L03.317 - Cellulitis of buttock








BRODERICK ZEE Jr. DO Oct 14, 2018 10:37

## 2018-10-15 VITALS — DIASTOLIC BLOOD PRESSURE: 80 MMHG | SYSTOLIC BLOOD PRESSURE: 145 MMHG

## 2018-10-15 VITALS — DIASTOLIC BLOOD PRESSURE: 92 MMHG | SYSTOLIC BLOOD PRESSURE: 141 MMHG

## 2018-10-15 VITALS — SYSTOLIC BLOOD PRESSURE: 137 MMHG | DIASTOLIC BLOOD PRESSURE: 78 MMHG

## 2018-10-15 VITALS — DIASTOLIC BLOOD PRESSURE: 69 MMHG | SYSTOLIC BLOOD PRESSURE: 116 MMHG

## 2018-10-15 VITALS — SYSTOLIC BLOOD PRESSURE: 138 MMHG | DIASTOLIC BLOOD PRESSURE: 70 MMHG

## 2018-10-15 VITALS — DIASTOLIC BLOOD PRESSURE: 84 MMHG | SYSTOLIC BLOOD PRESSURE: 144 MMHG

## 2018-10-15 LAB
ANION GAP SERPL CALC-SCNC: 7 MMOL/L (ref 6–14)
BASOPHILS # BLD AUTO: 0.1 X10^3/UL (ref 0–0.2)
BASOPHILS NFR BLD: 1 % (ref 0–3)
BUN SERPL-MCNC: 15 MG/DL (ref 8–26)
CALCIUM SERPL-MCNC: 8.5 MG/DL (ref 8.5–10.1)
CHLORIDE SERPL-SCNC: 101 MMOL/L (ref 98–107)
CO2 SERPL-SCNC: 27 MMOL/L (ref 21–32)
CREAT SERPL-MCNC: 1.3 MG/DL (ref 0.7–1.3)
EOSINOPHIL NFR BLD: 0.3 X10^3/UL (ref 0–0.7)
EOSINOPHIL NFR BLD: 2 % (ref 0–3)
ERYTHROCYTE [DISTWIDTH] IN BLOOD BY AUTOMATED COUNT: 13.3 % (ref 11.5–14.5)
GFR SERPLBLD BASED ON 1.73 SQ M-ARVRAT: 60 ML/MIN
GLUCOSE SERPL-MCNC: 366 MG/DL (ref 70–99)
HCT VFR BLD CALC: 31.9 % (ref 39–53)
HGB BLD-MCNC: 11 G/DL (ref 13–17.5)
LYMPHOCYTES # BLD: 2.1 X10^3/UL (ref 1–4.8)
LYMPHOCYTES NFR BLD AUTO: 12 % (ref 24–48)
MCH RBC QN AUTO: 31 PG (ref 25–35)
MCHC RBC AUTO-ENTMCNC: 34 G/DL (ref 31–37)
MCV RBC AUTO: 89 FL (ref 79–100)
MONO #: 1.8 X10^3/UL (ref 0–1.1)
MONOCYTES NFR BLD: 11 % (ref 0–9)
NEUT #: 12.6 X10^3UL (ref 1.8–7.7)
NEUTROPHILS NFR BLD AUTO: 75 % (ref 31–73)
PLATELET # BLD AUTO: 205 X10^3/UL (ref 140–400)
POTASSIUM SERPL-SCNC: 4.9 MMOL/L (ref 3.5–5.1)
RBC # BLD AUTO: 3.58 X10^6/UL (ref 4.3–5.7)
SODIUM SERPL-SCNC: 135 MMOL/L (ref 136–145)
WBC # BLD AUTO: 16.8 X10^3/UL (ref 4–11)

## 2018-10-15 RX ADMIN — INSULIN LISPRO SCH UNITS: 100 INJECTION, SOLUTION INTRAVENOUS; SUBCUTANEOUS at 17:00

## 2018-10-15 RX ADMIN — PIPERACILLIN SODIUM AND TAZOBACTAM SODIUM SCH MLS/HR: 3; .375 INJECTION, POWDER, LYOPHILIZED, FOR SOLUTION INTRAVENOUS at 23:18

## 2018-10-15 RX ADMIN — INSULIN LISPRO SCH UNITS: 100 INJECTION, SOLUTION INTRAVENOUS; SUBCUTANEOUS at 12:07

## 2018-10-15 RX ADMIN — ACETAMINOPHEN PRN MG: 325 TABLET, FILM COATED ORAL at 17:57

## 2018-10-15 RX ADMIN — LINEZOLID SCH MG: 600 TABLET, FILM COATED ORAL at 11:58

## 2018-10-15 RX ADMIN — HYDROCODONE BITARTRATE AND ACETAMINOPHEN PRN TAB: 5; 325 TABLET ORAL at 09:10

## 2018-10-15 RX ADMIN — Medication SCH CAP: at 12:08

## 2018-10-15 RX ADMIN — BACITRACIN SCH MLS/HR: 5000 INJECTION, POWDER, FOR SOLUTION INTRAMUSCULAR at 05:52

## 2018-10-15 RX ADMIN — CLINDAMYCIN IN 5 PERCENT DEXTROSE SCH MLS/HR: 12 INJECTION, SOLUTION INTRAVENOUS at 05:52

## 2018-10-15 RX ADMIN — INSULIN LISPRO SCH UNITS: 100 INJECTION, SOLUTION INTRAVENOUS; SUBCUTANEOUS at 17:59

## 2018-10-15 RX ADMIN — CELECOXIB SCH MG: 100 CAPSULE ORAL at 09:09

## 2018-10-15 RX ADMIN — Medication SCH CAP: at 20:03

## 2018-10-15 RX ADMIN — PIPERACILLIN SODIUM AND TAZOBACTAM SODIUM SCH MLS/HR: 3; .375 INJECTION, POWDER, LYOPHILIZED, FOR SOLUTION INTRAVENOUS at 11:58

## 2018-10-15 RX ADMIN — INSULIN GLARGINE SCH UNITS: 100 INJECTION, SOLUTION SUBCUTANEOUS at 20:47

## 2018-10-15 RX ADMIN — LINEZOLID SCH MG: 600 TABLET, FILM COATED ORAL at 21:54

## 2018-10-15 RX ADMIN — PIPERACILLIN SODIUM AND TAZOBACTAM SODIUM SCH MLS/HR: 3; .375 INJECTION, POWDER, LYOPHILIZED, FOR SOLUTION INTRAVENOUS at 17:50

## 2018-10-15 RX ADMIN — POLYETHYLENE GLYCOL 3350 SCH GM: 17 POWDER, FOR SOLUTION ORAL at 09:09

## 2018-10-15 RX ADMIN — DOCUSATE SODIUM SCH MG: 100 CAPSULE, LIQUID FILLED ORAL at 20:03

## 2018-10-15 RX ADMIN — INSULIN LISPRO SCH UNITS: 100 INJECTION, SOLUTION INTRAVENOUS; SUBCUTANEOUS at 12:08

## 2018-10-15 RX ADMIN — CELECOXIB SCH MG: 100 CAPSULE ORAL at 20:03

## 2018-10-15 RX ADMIN — DOCUSATE SODIUM SCH MG: 100 CAPSULE, LIQUID FILLED ORAL at 09:09

## 2018-10-15 RX ADMIN — HYDROCODONE BITARTRATE AND ACETAMINOPHEN PRN TAB: 5; 325 TABLET ORAL at 13:25

## 2018-10-15 RX ADMIN — HYDROCODONE BITARTRATE AND ACETAMINOPHEN PRN TAB: 5; 325 TABLET ORAL at 20:03

## 2018-10-15 RX ADMIN — INSULIN LISPRO SCH UNITS: 100 INJECTION, SOLUTION INTRAVENOUS; SUBCUTANEOUS at 09:19

## 2018-10-15 NOTE — PDOC2
CONSULT


Date of Consult


Date of Consult


DATE: 10/15/18 


TIME: 12:50





Reason for Consult


Reason for Consult:


perirectal erythema, pain





Referring Physician


Referring Physician:


Dr Brock





Identification/Chief Complaint


Chief Complaint


perirectal pain





Source


Source:  Chart review, Patient





History of Present Illness


Reason for Visit:


David is a 44 yo IDDM with a four day hx of erythema, pain, induration in the 

left perirectal area. No drainage. No similar previous episodes





Past Medical History


Endocrine:  Diabetes





Past Surgical History


Past Surgical History:  Other (shoulder surgery, lithotripsy, vasectomy)





Family History


Family History:  Diabetes, Heart Disease, High Cholestrol, Hypertension





Social History


No


ALCOHOL:  none


Drugs:  None





Current Problem List


Problem List


Problems


Medical Problems:


(1) Cellulitis


Status: Acute  











Current Medications


Current Medications





Current Medications


Fentanyl Citrate (Fentanyl 2ml Vial) 25 mcg PRN Q15MIN  PRN IV PAIN GREATER 

THAN 3/10 Last administered on 10/14/18at 14:14;  Start 10/14/18 at 10:30;  

Stop 10/15/18 at 10:29;  Status DC


Sodium Chloride 1,000 ml @  1,000 mls/hr Q1H IV  Last administered on 10/14/

18at 10:39;  Start 10/14/18 at 10:22;  Stop 10/14/18 at 11:21;  Status DC


Ondansetron HCl (Zofran) 4 mg 1X  ONCE IV  Last administered on 10/14/18at 10:45

;  Start 10/14/18 at 11:00;  Stop 10/14/18 at 11:01;  Status DC


Vancomycin HCl 250 ml @  250 mls/hr 1X  ONCE IV ;  Start 10/14/18 at 10:30;  

Stop 10/14/18 at 10:31;  Status DC


Piperacillin Sod/ Tazobactam Sod 3.375 gm/Sodium Chloride 50 ml @  100 mls/hr 

1X  ONCE IV  Last administered on 10/14/18at 11:04;  Start 10/14/18 at 11:00;  

Stop 10/14/18 at 11:29;  Status DC


Vancomycin HCl 1.75 gm/Sodium Chloride 500 ml @  250 mls/hr 1X  ONCE IV  Last 

administered on 10/14/18at 11:05;  Start 10/14/18 at 11:30;  Stop 10/14/18 at 13

:29;  Status DC


Iohexol (Omnipaque 300 Mg/ml) 75 ml 1X  ONCE IV ;  Start 10/14/18 at 11:45;  

Stop 10/14/18 at 11:48;  Status DC


Info (CONTRAST GIVEN -- Rx MONITORING) 1 each PRN DAILY  PRN MC SEE COMMENTS;  

Start 10/14/18 at 12:00;  Stop 10/16/18 at 11:59


Ondansetron HCl (Zofran) 4 mg PRN Q8HRS  PRN IV NAUSEA/VOMITING;  Start 10/14/

18 at 12:45;  Stop 10/14/18 at 12:55;  Status DC


Morphine Sulfate (Morphine Sulfate) 4 mg PRN Q2HR  PRN IV PAIN Last 

administered on 10/14/18at 17:32;  Start 10/14/18 at 12:45;  Stop 10/15/18 at 12

:44;  Status DC


Sodium Chloride 1,000 ml @  75 mls/hr [id number] IV  Last administered on 10/15/

18at 05:52;  Start 10/14/18 at 12:32;  Stop 10/15/18 at 12:31;  Status DC


Ondansetron HCl (Zofran) 4 mg PRN Q6HRS  PRN IV NAUSEA/VOMITING;  Start 10/14/

18 at 13:00


Celecoxib (CeleBREX) 100 mg BID PO  Last administered on 10/15/18at 09:09;  

Start 10/14/18 at 21:00


Acetaminophen/ Hydrocodone Bitart (Lortab 5/325) 1 tab PRN Q4HRS  PRN PO PAIN 

Last administered on 10/15/18at 09:10;  Start 10/14/18 at 13:00


Diphenhydramine HCl (Benadryl) 25 mg PRN QHS  PRN PO INSOMNIA;  Start 10/14/18 

at 13:00


Insulin Human Lispro (HumaLOG) 0-9 UNITS TIDWMEALS SQ  Last administered on 10/

15/18at 12:07;  Start 10/14/18 at 17:00


Dextrose (Dextrose 50%-Water Syringe) 12.5 gm PRN Q15MIN  PRN IV SEE COMMENTS;  

Start 10/14/18 at 13:00


Insulin Glargine (Lantus) 18 units QHS SQ  Last administered on 10/14/18at 20:57

;  Start 10/14/18 at 21:00;  Stop 10/15/18 at 10:13;  Status DC


Insulin Human Lispro (HumaLOG) 15 units TIDWMEALS SQ  Last administered on 10/15

/18at 09:19;  Start 10/14/18 at 17:00;  Stop 10/15/18 at 10:13;  Status DC


Acetaminophen/ Hydrocodone Bitart (Lortab 5/325) 1 tab PRN Q6HRS  PRN PO PAIN;  

Start 10/14/18 at 13:00;  Status UNV


Clindamycin Phosphate 50 ml @  100 mls/hr Q8HRS IV  Last administered on 10/15/

18at 05:52;  Start 10/14/18 at 22:00;  Stop 10/15/18 at 11:28;  Status DC


Acetaminophen (Tylenol) 650 mg PRN Q6HRS  PRN PO FEVER Last administered on 10/

14/18at 23:12;  Start 10/14/18 at 21:15


Docusate Sodium (Colace) 100 mg BID PO  Last administered on 10/15/18at 09:09;  

Start 10/14/18 at 22:00


Polyethylene Glycol (miraLAX PACKET) 17 gm DAILY PO  Last administered on 10/15/

18at 09:09;  Start 10/14/18 at 22:00


Magnesium Hydroxide (Milk Of Magnesia) 2,400 mg PRN DAILY  PRN PO CONSTIPATION 

1ST CHOICE;  Start 10/14/18 at 21:15


Influenza Virus Vaccine (Afluria Trivalent [id number] Syringe) 0.5 ml ONCE ONCE 

VAX IM ;  Start 10/15/18 at 09:00;  Stop 10/15/18 at 09:01;  Status DC


Insulin Glargine (Lantus) 22 units QHS SQ ;  Start 10/15/18 at 21:00;  Stop 10/

15/18 at 21:00;  Status DC


Insulin Human Lispro (HumaLOG) 20 units TIDWMEALS SQ  Last administered on 10/15

/18at 12:08;  Start 10/15/18 at 12:00


Insulin Glargine (Lantus) 18 units QHS SQ ;  Start 10/15/18 at 21:00


Piperacillin Sod/ Tazobactam Sod 3.375 gm/Sodium Chloride 50 ml @  100 mls/hr 

Q6HRS IV  Last administered on 10/15/18at 11:58;  Start 10/15/18 at 12:00


Linezolid (Zyvox) 600 mg BID PO  Last administered on 10/15/18at 11:58;  Start 

10/15/18 at 12:00


Lactobacillus Rhamnosus (Culturelle) 1 cap BID PO  Last administered on 10/15/

18at 12:08;  Start 10/15/18 at 12:00





Active Scripts


Active


Norco 5-325 Tablet (Acetaminophen/Hydrocodone Bitart) 1 Each Tablet 1 Tab PO 

PRN Q6HRS PRN


Bactrim Ds Tablet (Sulfamethoxazole/Trimethoprim) 1 Each Tablet 1 Tab PO BID


Reported


Humalog (Insulin Lispro) 100 Unit/1 Ml Cartridge 100 Unit SQ 


Lantus Solostar (Insulin Glargine,Hum.rec.anlog) 100 Unit/1 Ml Insuln.pen 10 

Unit SQ QHS





Allergies


Allergies:  


Coded Allergies:  


     No Known Drug Allergies (Unverified , 10/12/18)





ROS


Review of System


negative with exception of present complaints





Physical Exam


General:  Alert, Oriented X3, Cooperative, Other (uncomfortable 2/2 perirectal 

pain)


Lungs:  Normal air movement


Heart:  Regular rate


Abdomen:  Soft


Skin:  Other (left perirectal area with erythema, induration, no definite 

fluctuance)





Vitals


VITALS





Vital Signs








  Date Time  Temp Pulse Resp B/P (MAP) Pulse Ox O2 Delivery O2 Flow Rate FiO2


 


10/15/18 11:00 98.2 98 16 141/92 (108) 93 Room Air  





 98.2       











Labs


Labs





Laboratory Tests








Test


 10/14/18


10:30 10/14/18


16:21 10/14/18


20:26 10/15/18


03:45


 


White Blood Count


 22.1 x10^3/uL


(4.0-11.0) 


 


 16.8 x10^3/uL


(4.0-11.0)


 


Red Blood Count


 4.28 x10^6/uL


(4.30-5.70) 


 


 3.58 x10^6/uL


(4.30-5.70)


 


Hemoglobin


 12.8 g/dL


(13.0-17.5) 


 


 11.0 g/dL


(13.0-17.5)


 


Hematocrit


 37.7 %


(39.0-53.0) 


 


 31.9 %


(39.0-53.0)


 


Mean Corpuscular Volume 88 fL ()    89 fL () 


 


Mean Corpuscular Hemoglobin 30 pg (25-35)    31 pg (25-35) 


 


Mean Corpuscular Hemoglobin


Concent 34 g/dL


(31-37) 


 


 34 g/dL


(31-37)


 


Red Cell Distribution Width


 13.1 %


(11.5-14.5) 


 


 13.3 %


(11.5-14.5)


 


Platelet Count


 264 x10^3/uL


(140-400) 


 


 205 x10^3/uL


(140-400)


 


Neutrophils (%) (Auto) 79 % (31-73)    75 % (31-73) 


 


Lymphocytes (%) (Auto) 8 % (24-48)    12 % (24-48) 


 


Monocytes (%) (Auto) 12 % (0-9)    11 % (0-9) 


 


Eosinophils (%) (Auto) 1 % (0-3)    2 % (0-3) 


 


Basophils (%) (Auto) 0 % (0-3)    1 % (0-3) 


 


Neutrophils # (Auto)


 17.4 x10^3uL


(1.8-7.7) 


 


 12.6 x10^3uL


(1.8-7.7)


 


Lymphocytes # (Auto)


 1.8 x10^3/uL


(1.0-4.8) 


 


 2.1 x10^3/uL


(1.0-4.8)


 


Monocytes # (Auto)


 2.6 x10^3/uL


(0.0-1.1) 


 


 1.8 x10^3/uL


(0.0-1.1)


 


Eosinophils # (Auto)


 0.2 x10^3/uL


(0.0-0.7) 


 


 0.3 x10^3/uL


(0.0-0.7)


 


Basophils # (Auto)


 0.1 x10^3/uL


(0.0-0.2) 


 


 0.1 x10^3/uL


(0.0-0.2)


 


Segmented Neutrophils % 80 % (35-66)    


 


Band Neutrophils % 4 % (0-9)    


 


Lymphocytes % 9 % (24-48)    


 


Monocytes % 5 % (0-10)    


 


Eosinophils % 2 % (0-5)    


 


Platelet Estimate


 Adequate


(ADEQUATE) 


 


 





 


Erythrocyte Sedimentation Rate 59 (0-15)    


 


Sodium Level


 138 mmol/L


(136-145) 


 


 135 mmol/L


(136-145)


 


Potassium Level


 3.7 mmol/L


(3.5-5.1) 


 


 4.9 mmol/L


(3.5-5.1)


 


Chloride Level


 99 mmol/L


() 


 


 101 mmol/L


()


 


Carbon Dioxide Level


 28 mmol/L


(21-32) 


 


 27 mmol/L


(21-32)


 


Anion Gap 11 (6-14)    7 (6-14) 


 


Blood Urea Nitrogen


 14 mg/dL


(8-26) 


 


 15 mg/dL


(8-26)


 


Creatinine


 1.2 mg/dL


(0.7-1.3) 


 


 1.3 mg/dL


(0.7-1.3)


 


Estimated GFR


(Cockcroft-Gault) 65.8 


 


 


 60.0 





 


BUN/Creatinine Ratio 12 (6-20)    


 


Glucose Level


 224 mg/dL


(70-99) 


 


 366 mg/dL


(70-99)


 


Lactic Acid Level


 1.2 mmol/L


(0.4-2.0) 


 


 





 


Calcium Level


 9.5 mg/dL


(8.5-10.1) 


 


 8.5 mg/dL


(8.5-10.1)


 


Total Bilirubin


 0.6 mg/dL


(0.2-1.0) 


 


 





 


Aspartate Amino Transf


(AST/SGOT) 28 U/L (15-37) 


 


 


 





 


Alanine Aminotransferase


(ALT/SGPT) 38 U/L (16-63) 


 


 


 





 


Alkaline Phosphatase


 101 U/L


() 


 


 





 


Total Protein


 7.4 g/dL


(6.4-8.2) 


 


 





 


Albumin


 3.2 g/dL


(3.4-5.0) 


 


 





 


Albumin/Globulin Ratio 0.8 (1.0-1.7)    


 


Glucose (Fingerstick)


 


 235 mg/dL


(70-99) 231 mg/dL


(70-99) 





 


Test


 10/15/18


07:47 10/15/18


11:17 


 





 


Glucose (Fingerstick)


 308 mg/dL


(70-99) 211 mg/dL


(70-99) 


 











Laboratory Tests








Test


 10/14/18


16:21 10/14/18


20:26 10/15/18


03:45 10/15/18


07:47


 


Glucose (Fingerstick)


 235 mg/dL


(70-99) 231 mg/dL


(70-99) 


 308 mg/dL


(70-99)


 


White Blood Count


 


 


 16.8 x10^3/uL


(4.0-11.0) 





 


Red Blood Count


 


 


 3.58 x10^6/uL


(4.30-5.70) 





 


Hemoglobin


 


 


 11.0 g/dL


(13.0-17.5) 





 


Hematocrit


 


 


 31.9 %


(39.0-53.0) 





 


Mean Corpuscular Volume   89 fL ()  


 


Mean Corpuscular Hemoglobin   31 pg (25-35)  


 


Mean Corpuscular Hemoglobin


Concent 


 


 34 g/dL


(31-37) 





 


Red Cell Distribution Width


 


 


 13.3 %


(11.5-14.5) 





 


Platelet Count


 


 


 205 x10^3/uL


(140-400) 





 


Neutrophils (%) (Auto)   75 % (31-73)  


 


Lymphocytes (%) (Auto)   12 % (24-48)  


 


Monocytes (%) (Auto)   11 % (0-9)  


 


Eosinophils (%) (Auto)   2 % (0-3)  


 


Basophils (%) (Auto)   1 % (0-3)  


 


Neutrophils # (Auto)


 


 


 12.6 x10^3uL


(1.8-7.7) 





 


Lymphocytes # (Auto)


 


 


 2.1 x10^3/uL


(1.0-4.8) 





 


Monocytes # (Auto)


 


 


 1.8 x10^3/uL


(0.0-1.1) 





 


Eosinophils # (Auto)


 


 


 0.3 x10^3/uL


(0.0-0.7) 





 


Basophils # (Auto)


 


 


 0.1 x10^3/uL


(0.0-0.2) 





 


Sodium Level


 


 


 135 mmol/L


(136-145) 





 


Potassium Level


 


 


 4.9 mmol/L


(3.5-5.1) 





 


Chloride Level


 


 


 101 mmol/L


() 





 


Carbon Dioxide Level


 


 


 27 mmol/L


(21-32) 





 


Anion Gap   7 (6-14)  


 


Blood Urea Nitrogen


 


 


 15 mg/dL


(8-26) 





 


Creatinine


 


 


 1.3 mg/dL


(0.7-1.3) 





 


Estimated GFR


(Cockcroft-Gault) 


 


 60.0 


 





 


Glucose Level


 


 


 366 mg/dL


(70-99) 





 


Calcium Level


 


 


 8.5 mg/dL


(8.5-10.1) 





 


Test


 10/15/18


11:17 


 


 





 


Glucose (Fingerstick)


 211 mg/dL


(70-99) 


 


 














Images


Images


CT scan done on presentation is reviewed





Assessment/Plan


Assessment/Plan


perirectal cellulitis/early abscess


IDDM





continue abx


check soft tissue US in the AM for a drainable collection


NPO at midnight





d/w pt


questions answered


will follow 





Thanks for consult











BRANDEE CRUZ MD Oct 15, 2018 12:57

## 2018-10-15 NOTE — PDOC
Infectious Disease Note


Vital Sign


Vital Signs





Vital Signs








  Date Time  Temp Pulse Resp B/P (MAP) Pulse Ox O2 Delivery O2 Flow Rate FiO2


 


10/15/18 09:10   20  91 Room Air  


 


10/15/18 07:00 98.4 87  137/78 (97)    





 98.4       











Labs


Lab





Laboratory Tests








Test


 10/14/18


16:21 10/14/18


20:26 10/15/18


03:45 10/15/18


07:47


 


Glucose (Fingerstick)


 235 mg/dL


(70-99) 231 mg/dL


(70-99) 


 308 mg/dL


(70-99)


 


White Blood Count


 


 


 16.8 x10^3/uL


(4.0-11.0) 





 


Red Blood Count


 


 


 3.58 x10^6/uL


(4.30-5.70) 





 


Hemoglobin


 


 


 11.0 g/dL


(13.0-17.5) 





 


Hematocrit


 


 


 31.9 %


(39.0-53.0) 





 


Mean Corpuscular Volume   89 fL ()  


 


Mean Corpuscular Hemoglobin   31 pg (25-35)  


 


Mean Corpuscular Hemoglobin


Concent 


 


 34 g/dL


(31-37) 





 


Red Cell Distribution Width


 


 


 13.3 %


(11.5-14.5) 





 


Platelet Count


 


 


 205 x10^3/uL


(140-400) 





 


Neutrophils (%) (Auto)   75 % (31-73)  


 


Lymphocytes (%) (Auto)   12 % (24-48)  


 


Monocytes (%) (Auto)   11 % (0-9)  


 


Eosinophils (%) (Auto)   2 % (0-3)  


 


Basophils (%) (Auto)   1 % (0-3)  


 


Neutrophils # (Auto)


 


 


 12.6 x10^3uL


(1.8-7.7) 





 


Lymphocytes # (Auto)


 


 


 2.1 x10^3/uL


(1.0-4.8) 





 


Monocytes # (Auto)


 


 


 1.8 x10^3/uL


(0.0-1.1) 





 


Eosinophils # (Auto)


 


 


 0.3 x10^3/uL


(0.0-0.7) 





 


Basophils # (Auto)


 


 


 0.1 x10^3/uL


(0.0-0.2) 





 


Sodium Level


 


 


 135 mmol/L


(136-145) 





 


Potassium Level


 


 


 4.9 mmol/L


(3.5-5.1) 





 


Chloride Level


 


 


 101 mmol/L


() 





 


Carbon Dioxide Level


 


 


 27 mmol/L


(21-32) 





 


Anion Gap   7 (6-14)  


 


Blood Urea Nitrogen


 


 


 15 mg/dL


(8-26) 





 


Creatinine


 


 


 1.3 mg/dL


(0.7-1.3) 





 


Estimated GFR


(Cockcroft-Gault) 


 


 60.0 


 





 


Glucose Level


 


 


 366 mg/dL


(70-99) 





 


Calcium Level


 


 


 8.5 mg/dL


(8.5-10.1) 











Micro





Microbiology


10/14/18 Blood Culture - Preliminary, Resulted


           NO GROWTH AFTER 1 DAY





Objective


Assessment


Leukocytosis


Perirectal infection


Fever


DM





Plan


Plan of Care


Discont Vanc add Zyvox po and cont Zosyn


Can d/c clinda


F/u labs and cults


Await surgical eval





d/w family





Thank you





# [id number]











EUGENIO GAGNON MD Oct 15, 2018 11:28

## 2018-10-15 NOTE — PDOC
PROGRESS NOTES


Chief Complaint


Chief Complaint


 


Left perineal cellulitis-no evidence clinically of yocasta's gangrene 


Sepsis  


Diabetes 1, insulin req.  with unknown hemoglobin A1c -  


rectal pain, cannot void





History of Present Illness


History of Present Illness


consult ID,  abx changed to clinda


consult gen surg,   he feels swelling is worse,  may benefit from small I+D or 

lancing, 


pain control OK


check bladder scan





Vitals


Vitals





Vital Signs








  Date Time  Temp Pulse Resp B/P (MAP) Pulse Ox O2 Delivery O2 Flow Rate FiO2


 


10/15/18 09:10   20  91 Room Air  


 


10/15/18 07:00 98.4 87  137/78 (97)    





 98.4       











Physical Exam


General:  Alert, Oriented X3, Cooperative, No acute distress


Abdomen:  Normal bowel sounds, Soft, No tenderness, No hepatosplenomegaly, No 

masses


Extremities:  No clubbing, No cyanosis, No edema, Normal pulses, No tenderness/

swelling





Labs


LABS





Laboratory Tests








Test


 10/14/18


10:30 10/14/18


16:21 10/14/18


20:26 10/15/18


03:45


 


White Blood Count


 22.1 x10^3/uL


(4.0-11.0) 


 


 16.8 x10^3/uL


(4.0-11.0)


 


Red Blood Count


 4.28 x10^6/uL


(4.30-5.70) 


 


 3.58 x10^6/uL


(4.30-5.70)


 


Hemoglobin


 12.8 g/dL


(13.0-17.5) 


 


 11.0 g/dL


(13.0-17.5)


 


Hematocrit


 37.7 %


(39.0-53.0) 


 


 31.9 %


(39.0-53.0)


 


Mean Corpuscular Volume 88 fL ()    89 fL () 


 


Mean Corpuscular Hemoglobin 30 pg (25-35)    31 pg (25-35) 


 


Mean Corpuscular Hemoglobin


Concent 34 g/dL


(31-37) 


 


 34 g/dL


(31-37)


 


Red Cell Distribution Width


 13.1 %


(11.5-14.5) 


 


 13.3 %


(11.5-14.5)


 


Platelet Count


 264 x10^3/uL


(140-400) 


 


 205 x10^3/uL


(140-400)


 


Neutrophils (%) (Auto) 79 % (31-73)    75 % (31-73) 


 


Lymphocytes (%) (Auto) 8 % (24-48)    12 % (24-48) 


 


Monocytes (%) (Auto) 12 % (0-9)    11 % (0-9) 


 


Eosinophils (%) (Auto) 1 % (0-3)    2 % (0-3) 


 


Basophils (%) (Auto) 0 % (0-3)    1 % (0-3) 


 


Neutrophils # (Auto)


 17.4 x10^3uL


(1.8-7.7) 


 


 12.6 x10^3uL


(1.8-7.7)


 


Lymphocytes # (Auto)


 1.8 x10^3/uL


(1.0-4.8) 


 


 2.1 x10^3/uL


(1.0-4.8)


 


Monocytes # (Auto)


 2.6 x10^3/uL


(0.0-1.1) 


 


 1.8 x10^3/uL


(0.0-1.1)


 


Eosinophils # (Auto)


 0.2 x10^3/uL


(0.0-0.7) 


 


 0.3 x10^3/uL


(0.0-0.7)


 


Basophils # (Auto)


 0.1 x10^3/uL


(0.0-0.2) 


 


 0.1 x10^3/uL


(0.0-0.2)


 


Segmented Neutrophils % 80 % (35-66)    


 


Band Neutrophils % 4 % (0-9)    


 


Lymphocytes % 9 % (24-48)    


 


Monocytes % 5 % (0-10)    


 


Eosinophils % 2 % (0-5)    


 


Platelet Estimate


 Adequate


(ADEQUATE) 


 


 





 


Erythrocyte Sedimentation Rate 59 (0-15)    


 


Sodium Level


 138 mmol/L


(136-145) 


 


 135 mmol/L


(136-145)


 


Potassium Level


 3.7 mmol/L


(3.5-5.1) 


 


 4.9 mmol/L


(3.5-5.1)


 


Chloride Level


 99 mmol/L


() 


 


 101 mmol/L


()


 


Carbon Dioxide Level


 28 mmol/L


(21-32) 


 


 27 mmol/L


(21-32)


 


Anion Gap 11 (6-14)    7 (6-14) 


 


Blood Urea Nitrogen


 14 mg/dL


(8-26) 


 


 15 mg/dL


(8-26)


 


Creatinine


 1.2 mg/dL


(0.7-1.3) 


 


 1.3 mg/dL


(0.7-1.3)


 


Estimated GFR


(Cockcroft-Gault) 65.8 


 


 


 60.0 





 


BUN/Creatinine Ratio 12 (6-20)    


 


Glucose Level


 224 mg/dL


(70-99) 


 


 366 mg/dL


(70-99)


 


Lactic Acid Level


 1.2 mmol/L


(0.4-2.0) 


 


 





 


Calcium Level


 9.5 mg/dL


(8.5-10.1) 


 


 8.5 mg/dL


(8.5-10.1)


 


Total Bilirubin


 0.6 mg/dL


(0.2-1.0) 


 


 





 


Aspartate Amino Transf


(AST/SGOT) 28 U/L (15-37) 


 


 


 





 


Alanine Aminotransferase


(ALT/SGPT) 38 U/L (16-63) 


 


 


 





 


Alkaline Phosphatase


 101 U/L


() 


 


 





 


Total Protein


 7.4 g/dL


(6.4-8.2) 


 


 





 


Albumin


 3.2 g/dL


(3.4-5.0) 


 


 





 


Albumin/Globulin Ratio 0.8 (1.0-1.7)    


 


Glucose (Fingerstick)


 


 235 mg/dL


(70-99) 231 mg/dL


(70-99) 





 


Test


 10/15/18


07:47 


 


 





 


Glucose (Fingerstick)


 308 mg/dL


(70-99) 


 


 














Review of Systems


Review of Systems


pain, nausea,   poor PO intake, not hungry





Assessment and Plan


Assessmemt and Plan


Problems


Medical Problems:


(1) Cellulitis


Status: Acute  











Comment


Review of Relevant


I have reviewed the following items itz (where applicable) has been applied.


Labs





Laboratory Tests








Test


 10/14/18


10:30 10/14/18


16:21 10/14/18


20:26 10/15/18


03:45


 


White Blood Count


 22.1 x10^3/uL


(4.0-11.0) 


 


 16.8 x10^3/uL


(4.0-11.0)


 


Red Blood Count


 4.28 x10^6/uL


(4.30-5.70) 


 


 3.58 x10^6/uL


(4.30-5.70)


 


Hemoglobin


 12.8 g/dL


(13.0-17.5) 


 


 11.0 g/dL


(13.0-17.5)


 


Hematocrit


 37.7 %


(39.0-53.0) 


 


 31.9 %


(39.0-53.0)


 


Mean Corpuscular Volume 88 fL ()    89 fL () 


 


Mean Corpuscular Hemoglobin 30 pg (25-35)    31 pg (25-35) 


 


Mean Corpuscular Hemoglobin


Concent 34 g/dL


(31-37) 


 


 34 g/dL


(31-37)


 


Red Cell Distribution Width


 13.1 %


(11.5-14.5) 


 


 13.3 %


(11.5-14.5)


 


Platelet Count


 264 x10^3/uL


(140-400) 


 


 205 x10^3/uL


(140-400)


 


Neutrophils (%) (Auto) 79 % (31-73)    75 % (31-73) 


 


Lymphocytes (%) (Auto) 8 % (24-48)    12 % (24-48) 


 


Monocytes (%) (Auto) 12 % (0-9)    11 % (0-9) 


 


Eosinophils (%) (Auto) 1 % (0-3)    2 % (0-3) 


 


Basophils (%) (Auto) 0 % (0-3)    1 % (0-3) 


 


Neutrophils # (Auto)


 17.4 x10^3uL


(1.8-7.7) 


 


 12.6 x10^3uL


(1.8-7.7)


 


Lymphocytes # (Auto)


 1.8 x10^3/uL


(1.0-4.8) 


 


 2.1 x10^3/uL


(1.0-4.8)


 


Monocytes # (Auto)


 2.6 x10^3/uL


(0.0-1.1) 


 


 1.8 x10^3/uL


(0.0-1.1)


 


Eosinophils # (Auto)


 0.2 x10^3/uL


(0.0-0.7) 


 


 0.3 x10^3/uL


(0.0-0.7)


 


Basophils # (Auto)


 0.1 x10^3/uL


(0.0-0.2) 


 


 0.1 x10^3/uL


(0.0-0.2)


 


Segmented Neutrophils % 80 % (35-66)    


 


Band Neutrophils % 4 % (0-9)    


 


Lymphocytes % 9 % (24-48)    


 


Monocytes % 5 % (0-10)    


 


Eosinophils % 2 % (0-5)    


 


Platelet Estimate


 Adequate


(ADEQUATE) 


 


 





 


Erythrocyte Sedimentation Rate 59 (0-15)    


 


Sodium Level


 138 mmol/L


(136-145) 


 


 135 mmol/L


(136-145)


 


Potassium Level


 3.7 mmol/L


(3.5-5.1) 


 


 4.9 mmol/L


(3.5-5.1)


 


Chloride Level


 99 mmol/L


() 


 


 101 mmol/L


()


 


Carbon Dioxide Level


 28 mmol/L


(21-32) 


 


 27 mmol/L


(21-32)


 


Anion Gap 11 (6-14)    7 (6-14) 


 


Blood Urea Nitrogen


 14 mg/dL


(8-26) 


 


 15 mg/dL


(8-26)


 


Creatinine


 1.2 mg/dL


(0.7-1.3) 


 


 1.3 mg/dL


(0.7-1.3)


 


Estimated GFR


(Cockcroft-Gault) 65.8 


 


 


 60.0 





 


BUN/Creatinine Ratio 12 (6-20)    


 


Glucose Level


 224 mg/dL


(70-99) 


 


 366 mg/dL


(70-99)


 


Lactic Acid Level


 1.2 mmol/L


(0.4-2.0) 


 


 





 


Calcium Level


 9.5 mg/dL


(8.5-10.1) 


 


 8.5 mg/dL


(8.5-10.1)


 


Total Bilirubin


 0.6 mg/dL


(0.2-1.0) 


 


 





 


Aspartate Amino Transf


(AST/SGOT) 28 U/L (15-37) 


 


 


 





 


Alanine Aminotransferase


(ALT/SGPT) 38 U/L (16-63) 


 


 


 





 


Alkaline Phosphatase


 101 U/L


() 


 


 





 


Total Protein


 7.4 g/dL


(6.4-8.2) 


 


 





 


Albumin


 3.2 g/dL


(3.4-5.0) 


 


 





 


Albumin/Globulin Ratio 0.8 (1.0-1.7)    


 


Glucose (Fingerstick)


 


 235 mg/dL


(70-99) 231 mg/dL


(70-99) 





 


Test


 10/15/18


07:47 


 


 





 


Glucose (Fingerstick)


 308 mg/dL


(70-99) 


 


 











Laboratory Tests








Test


 10/14/18


10:30 10/14/18


16:21 10/14/18


20:26 10/15/18


03:45


 


White Blood Count


 22.1 x10^3/uL


(4.0-11.0) 


 


 16.8 x10^3/uL


(4.0-11.0)


 


Red Blood Count


 4.28 x10^6/uL


(4.30-5.70) 


 


 3.58 x10^6/uL


(4.30-5.70)


 


Hemoglobin


 12.8 g/dL


(13.0-17.5) 


 


 11.0 g/dL


(13.0-17.5)


 


Hematocrit


 37.7 %


(39.0-53.0) 


 


 31.9 %


(39.0-53.0)


 


Mean Corpuscular Volume 88 fL ()    89 fL () 


 


Mean Corpuscular Hemoglobin 30 pg (25-35)    31 pg (25-35) 


 


Mean Corpuscular Hemoglobin


Concent 34 g/dL


(31-37) 


 


 34 g/dL


(31-37)


 


Red Cell Distribution Width


 13.1 %


(11.5-14.5) 


 


 13.3 %


(11.5-14.5)


 


Platelet Count


 264 x10^3/uL


(140-400) 


 


 205 x10^3/uL


(140-400)


 


Neutrophils (%) (Auto) 79 % (31-73)    75 % (31-73) 


 


Lymphocytes (%) (Auto) 8 % (24-48)    12 % (24-48) 


 


Monocytes (%) (Auto) 12 % (0-9)    11 % (0-9) 


 


Eosinophils (%) (Auto) 1 % (0-3)    2 % (0-3) 


 


Basophils (%) (Auto) 0 % (0-3)    1 % (0-3) 


 


Neutrophils # (Auto)


 17.4 x10^3uL


(1.8-7.7) 


 


 12.6 x10^3uL


(1.8-7.7)


 


Lymphocytes # (Auto)


 1.8 x10^3/uL


(1.0-4.8) 


 


 2.1 x10^3/uL


(1.0-4.8)


 


Monocytes # (Auto)


 2.6 x10^3/uL


(0.0-1.1) 


 


 1.8 x10^3/uL


(0.0-1.1)


 


Eosinophils # (Auto)


 0.2 x10^3/uL


(0.0-0.7) 


 


 0.3 x10^3/uL


(0.0-0.7)


 


Basophils # (Auto)


 0.1 x10^3/uL


(0.0-0.2) 


 


 0.1 x10^3/uL


(0.0-0.2)


 


Segmented Neutrophils % 80 % (35-66)    


 


Band Neutrophils % 4 % (0-9)    


 


Lymphocytes % 9 % (24-48)    


 


Monocytes % 5 % (0-10)    


 


Eosinophils % 2 % (0-5)    


 


Platelet Estimate


 Adequate


(ADEQUATE) 


 


 





 


Erythrocyte Sedimentation Rate 59 (0-15)    


 


Sodium Level


 138 mmol/L


(136-145) 


 


 135 mmol/L


(136-145)


 


Potassium Level


 3.7 mmol/L


(3.5-5.1) 


 


 4.9 mmol/L


(3.5-5.1)


 


Chloride Level


 99 mmol/L


() 


 


 101 mmol/L


()


 


Carbon Dioxide Level


 28 mmol/L


(21-32) 


 


 27 mmol/L


(21-32)


 


Anion Gap 11 (6-14)    7 (6-14) 


 


Blood Urea Nitrogen


 14 mg/dL


(8-26) 


 


 15 mg/dL


(8-26)


 


Creatinine


 1.2 mg/dL


(0.7-1.3) 


 


 1.3 mg/dL


(0.7-1.3)


 


Estimated GFR


(Cockcroft-Gault) 65.8 


 


 


 60.0 





 


BUN/Creatinine Ratio 12 (6-20)    


 


Glucose Level


 224 mg/dL


(70-99) 


 


 366 mg/dL


(70-99)


 


Lactic Acid Level


 1.2 mmol/L


(0.4-2.0) 


 


 





 


Calcium Level


 9.5 mg/dL


(8.5-10.1) 


 


 8.5 mg/dL


(8.5-10.1)


 


Total Bilirubin


 0.6 mg/dL


(0.2-1.0) 


 


 





 


Aspartate Amino Transf


(AST/SGOT) 28 U/L (15-37) 


 


 


 





 


Alanine Aminotransferase


(ALT/SGPT) 38 U/L (16-63) 


 


 


 





 


Alkaline Phosphatase


 101 U/L


() 


 


 





 


Total Protein


 7.4 g/dL


(6.4-8.2) 


 


 





 


Albumin


 3.2 g/dL


(3.4-5.0) 


 


 





 


Albumin/Globulin Ratio 0.8 (1.0-1.7)    


 


Glucose (Fingerstick)


 


 235 mg/dL


(70-99) 231 mg/dL


(70-99) 





 


Test


 10/15/18


07:47 


 


 





 


Glucose (Fingerstick)


 308 mg/dL


(70-99) 


 


 











Medications





Current Medications


Fentanyl Citrate (Fentanyl 2ml Vial) 25 mcg PRN Q15MIN  PRN IV PAIN GREATER 

THAN 3/10 Last administered on 10/14/18at 14:14;  Start 10/14/18 at 10:30;  

Stop 10/15/18 at 10:29


Sodium Chloride 1,000 ml @  1,000 mls/hr Q1H IV  Last administered on 10/14/

18at 10:39;  Start 10/14/18 at 10:22;  Stop 10/14/18 at 11:21;  Status DC


Ondansetron HCl (Zofran) 4 mg 1X  ONCE IV  Last administered on 10/14/18at 10:45

;  Start 10/14/18 at 11:00;  Stop 10/14/18 at 11:01;  Status DC


Vancomycin HCl 250 ml @  250 mls/hr 1X  ONCE IV ;  Start 10/14/18 at 10:30;  

Stop 10/14/18 at 10:31;  Status DC


Piperacillin Sod/ Tazobactam Sod 3.375 gm/Sodium Chloride 50 ml @  100 mls/hr 

1X  ONCE IV  Last administered on 10/14/18at 11:04;  Start 10/14/18 at 11:00;  

Stop 10/14/18 at 11:29;  Status DC


Vancomycin HCl 1.75 gm/Sodium Chloride 500 ml @  250 mls/hr 1X  ONCE IV  Last 

administered on 10/14/18at 11:05;  Start 10/14/18 at 11:30;  Stop 10/14/18 at 13

:29;  Status DC


Iohexol (Omnipaque 300 Mg/ml) 75 ml 1X  ONCE IV ;  Start 10/14/18 at 11:45;  

Stop 10/14/18 at 11:48;  Status DC


Info (CONTRAST GIVEN -- Rx MONITORING) 1 each PRN DAILY  PRN MC SEE COMMENTS;  

Start 10/14/18 at 12:00;  Stop 10/16/18 at 11:59


Ondansetron HCl (Zofran) 4 mg PRN Q8HRS  PRN IV NAUSEA/VOMITING;  Start 10/14/

18 at 12:45;  Stop 10/14/18 at 12:55;  Status DC


Morphine Sulfate (Morphine Sulfate) 4 mg PRN Q2HR  PRN IV PAIN Last 

administered on 10/14/18at 17:32;  Start 10/14/18 at 12:45;  Stop 10/15/18 at 12

:44


Sodium Chloride 1,000 ml @  125 mls/hr Q8H IV  Last administered on 10/15/18at 

05:52;  Start 10/14/18 at 12:32;  Stop 10/15/18 at 12:31


Ondansetron HCl (Zofran) 4 mg PRN Q6HRS  PRN IV NAUSEA/VOMITING;  Start 10/14/

18 at 13:00


Celecoxib (CeleBREX) 100 mg BID PO  Last administered on 10/15/18at 09:09;  

Start 10/14/18 at 21:00


Acetaminophen/ Hydrocodone Bitart (Lortab 5/325) 1 tab PRN Q4HRS  PRN PO PAIN 

Last administered on 10/15/18at 09:10;  Start 10/14/18 at 13:00


Diphenhydramine HCl (Benadryl) 25 mg PRN QHS  PRN PO INSOMNIA;  Start 10/14/18 

at 13:00


Insulin Human Lispro (HumaLOG) 0-9 UNITS TIDWMEALS SQ  Last administered on 10/

15/18at 09:19;  Start 10/14/18 at 17:00


Dextrose (Dextrose 50%-Water Syringe) 12.5 gm PRN Q15MIN  PRN IV SEE COMMENTS;  

Start 10/14/18 at 13:00


Insulin Glargine (Lantus) 18 units QHS SQ  Last administered on 10/14/18at 20:57

;  Start 10/14/18 at 21:00


Insulin Human Lispro (HumaLOG) 15 units TIDWMEALS SQ  Last administered on 10/15

/18at 09:19;  Start 10/14/18 at 17:00


Acetaminophen/ Hydrocodone Bitart (Lortab 5/325) 1 tab PRN Q6HRS  PRN PO PAIN;  

Start 10/14/18 at 13:00;  Status UNV


Clindamycin Phosphate 50 ml @  100 mls/hr Q8HRS IV  Last administered on 10/15/

18at 05:52;  Start 10/14/18 at 22:00


Acetaminophen (Tylenol) 650 mg PRN Q6HRS  PRN PO FEVER Last administered on 10/

14/18at 23:12;  Start 10/14/18 at 21:15


Docusate Sodium (Colace) 100 mg BID PO  Last administered on 10/15/18at 09:09;  

Start 10/14/18 at 22:00


Polyethylene Glycol (miraLAX PACKET) 17 gm DAILY PO  Last administered on 10/15/

18at 09:09;  Start 10/14/18 at 22:00


Magnesium Hydroxide (Milk Of Magnesia) 2,400 mg PRN DAILY  PRN PO CONSTIPATION 

1ST CHOICE;  Start 10/14/18 at 21:15


Influenza Virus Vaccine (Afluria Trivalent [id number] Syringe) 0.5 ml ONCE ONCE 

VAX IM ;  Start 10/15/18 at 09:00;  Stop 10/15/18 at 09:01;  Status DC





Active Scripts


Active


Norco 5-325 Tablet (Acetaminophen/Hydrocodone Bitart) 1 Each Tablet 1 Tab PO 

PRN Q6HRS PRN


Bactrim Ds Tablet (Sulfamethoxazole/Trimethoprim) 1 Each Tablet 1 Tab PO BID


Reported


Humalog (Insulin Lispro) 100 Unit/1 Ml Cartridge 100 Unit SQ 


Lantus Solostar (Insulin Glargine,Hum.rec.anlog) 100 Unit/1 Ml Insuln.pen 10 

Unit SQ QHS


Vitals/I & O





Vital Sign - Last 24 Hours








 10/14/18 10/14/18 10/14/18 10/14/18





 10:35 10:47 11:05 11:09


 


Pulse 110  98 


 


Resp 16 16 16 16


 


B/P (MAP) 173/79 (110)  137/66 (89) 


 


Pulse Ox 98 98 100 98


 


O2 Delivery Room Air  Room Air 





 10/14/18 10/14/18 10/14/18 10/14/18





 11:37 12:07 12:37 13:07


 


Pulse 98 97 98 98


 


Resp 16 18 18 18


 


B/P (MAP) 154/72 (99) 168/78 (108) 157/74 (101) 146/70 (95)


 


Pulse Ox 100 97 98 98


 


O2 Delivery Room Air Room Air Room Air Room Air





 10/14/18 10/14/18 10/14/18 10/14/18





 13:25 13:37 14:07 14:14


 


Pulse  97 97 


 


Resp 16 18 18 16


 


B/P (MAP)  146/67 (93) 170/80 (110) 


 


Pulse Ox 99 98 98 99


 


O2 Delivery  Room Air Room Air Room Air





 10/14/18 10/14/18 10/14/18 10/14/18





 14:45 14:45 15:00 16:48


 


Temp   100.9 





   100.9 


 


Pulse   101 


 


Resp 18  17 20


 


B/P (MAP)   145/79 (101) 


 


Pulse Ox 97  97 97


 


O2 Delivery Room Air Room Air  Room Air


 


    





    





 10/14/18 10/14/18 10/14/18 10/14/18





 17:32 17:48 17:48 19:00


 


Temp    101.3





    101.3


 


Pulse    107


 


Resp 18 18 18 20


 


B/P (MAP)    148/78 (101)


 


Pulse Ox 97 97  92


 


O2 Delivery Room Air Room Air  Room Air


 


    





    





 10/14/18 10/14/18 10/14/18 10/14/18





 20:15 20:53 21:53 23:00


 


Temp    101.7





    101.7


 


Pulse    99


 


Resp    20


 


B/P (MAP)    138/73 (94)


 


Pulse Ox  92 92 95


 


O2 Delivery Room Air Room Air Room Air Room Air


 


    





    





 10/15/18 10/15/18 10/15/18 





 03:05 07:00 09:10 


 


Temp 98.2 98.4  





 98.2 98.4  


 


Pulse 86 87  


 


Resp 20 16 20 


 


B/P (MAP) 138/70 (92) 137/78 (97)  


 


Pulse Ox 95 91 91 


 


O2 Delivery Room Air Room Air Room Air 














Intake and Output   


 


 10/14/18 10/14/18 10/15/18





 15:00 23:00 07:00


 


Intake Total 1550 ml 1500 ml 


 


Output Total   1000 ml


 


Balance 1550 ml 1500 ml -1000 ml

















MICHELLE ZAMORA MD Oct 15, 2018 10:27

## 2018-10-15 NOTE — CONS
DATE OF CONSULTATION:  10/15/2018



INFECTIOUS DISEASE CONSULTATION



LOCATION:  The patient's room is 428.



REQUESTING PHYSICIAN:  Dr. Brock.



REASON FOR CONSULTATION:  Perirectal abscess.



HISTORY OF PRESENT ILLNESS:  The patient is a pleasant 43-year-old gentleman,

with a longstanding history of diabetes, who approximately 6 days ago began to

feel ill.  He had somewhat of a flu-like illness with fevers and chills with

some aches.  He presented to [facility] Emergency Room on

10/12/2018 and was discharged home with thoughts of a viral syndrome was going

on.  He then returned on 10/13/2018 as he was having complications with

constipation.  He went home and found a fullness in the right perirectal area. 

He was given some Norco and Bactrim, which he began to take; however, he then

returned on 10/14/2018, had some nausea, vomiting and increasing fevers, chills,

sweats and worsening pain in his buttock area, particularly when he stood up,

moved around or walked.  On arrival, he had a white count of 22.1.  Glucose was

elevated at 224.  Blood cultures were obtained.  Additionally, he underwent a CT

scan of his pelvis with contrast and was found to have a left perineal, inferior

left gluteal moderate subcutaneous induration suggestive of cellulitis and he

also had bilateral inflamed nodes.  He was placed on vancomycin, Zosyn and

clindamycin when he was in the hospital.



Currently, the patient is more comfortable, is sitting up.  He has had a bowel

movement.  He has no gross headaches, sore throat, cough or chest pain.  No

dysuria, frequency or urgency.  He denies any falls or trauma.



PAST MEDICAL HISTORY:  Positive for longstanding diabetes and history of kidney

stones.



PAST SURGICAL HISTORY:  Positive for a shoulder surgery, lithotripsy and

vasectomy.



REVIEW OF SYSTEMS:  Otherwise negative except as mentioned above.



ALLERGIES:  No known drug allergies.



SOCIAL HISTORY:  No tobacco or alcohol.  He works in a metered industry and

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



FAMILY HISTORY:  Positive for diabetes, heart disease, hypercholesterolemia and

hypertension.



CURRENT MEDICATIONS:  Include clindamycin, Zosyn and vancomycin x 1, Celebrex,

Colace, Benadryl, Lantus insulin, Humalog, Zofran.  Other meds are available and

have been reviewed in the chart.



PHYSICAL EXAMINATION:

VITAL SIGNS:  T-max was 101.7, currently 98.4, pulse 87, respirations 20, blood

pressure 137/78, satting 91% on room air.

CONSTITUTIONAL:  He is a very pleasant gentleman.  He is cooperative.  He is in

no acute distress.

HEENT:  Pupils equal and reactive with normal conjunctivae.  Oral cavity,

pharynx is clear.

NECK:  Supple with good range of motion.

LUNGS:  Clear to auscultation bilaterally.

HEART:  S1, S2.

ABDOMEN:  Soft, nontender, nondistended with positive bowel sounds.

EXTREMITIES:  Without clubbing, cyanosis or gross edema.

SKIN:  Warm to touch without signs of generalized rash.  In the left medial

buttock area, there is a fullness, some induration and tenderness on the left

perirectal area.  There is no gross drainage.  There is no skin breakdown.

NEUROLOGIC:  He is nonfocal and appropriate.

PSYCHIATRIC:  Affect is pleasant.



LABORATORY VALUES:  White count today 16.8, hemoglobin 11, platelets of 205, 75

neutrophils, 12 lymphs.  Sed rate was 59, glucose of 366, creatinine of 1.3. 

Influenza screen negative from the other day.



Radiology reviewed in history of present illness.



IMPRESSION:

1.  Leukocytosis.

2.  Perirectal inflammation.

3.  Fever.

4.  Diabetes.



RECOMMENDATIONS:  Discontinue the vancomycin.  We will add Zyvox p.o. and

continue the Zosyn.  Can discontinue the clindamycin.  Follow labs and cultures,

await surgical evaluation.  This was discussed with family.



Thank you for asking us to participate in this patient's care.  If you have any

further questions, please do not hesitate to contact me.

 



______________________________

EUGENIO GAGNON MD



DR:  KIMBERLY/lew  JOB#:  [id number] / [id number]

DD:  10/15/2018 11:26  DT:  10/15/2018 23:28

## 2018-10-16 VITALS — DIASTOLIC BLOOD PRESSURE: 85 MMHG | SYSTOLIC BLOOD PRESSURE: 148 MMHG

## 2018-10-16 VITALS — DIASTOLIC BLOOD PRESSURE: 79 MMHG | SYSTOLIC BLOOD PRESSURE: 136 MMHG

## 2018-10-16 VITALS — SYSTOLIC BLOOD PRESSURE: 147 MMHG | DIASTOLIC BLOOD PRESSURE: 84 MMHG

## 2018-10-16 VITALS — DIASTOLIC BLOOD PRESSURE: 97 MMHG | SYSTOLIC BLOOD PRESSURE: 152 MMHG

## 2018-10-16 VITALS — SYSTOLIC BLOOD PRESSURE: 148 MMHG | DIASTOLIC BLOOD PRESSURE: 78 MMHG

## 2018-10-16 LAB
ALBUMIN SERPL-MCNC: 2.4 G/DL (ref 3.4–5)
ALBUMIN/GLOB SERPL: 0.6 {RATIO} (ref 1–1.7)
ALP SERPL-CCNC: 165 U/L (ref 46–116)
ALT SERPL-CCNC: 188 U/L (ref 16–63)
ANION GAP SERPL CALC-SCNC: 12 MMOL/L (ref 6–14)
AST SERPL-CCNC: 232 U/L (ref 15–37)
BASOPHILS # BLD AUTO: 0.1 X10^3/UL (ref 0–0.2)
BASOPHILS NFR BLD: 0 % (ref 0–3)
BILIRUB SERPL-MCNC: 0.9 MG/DL (ref 0.2–1)
BUN SERPL-MCNC: 16 MG/DL (ref 8–26)
BUN/CREAT SERPL: 13 (ref 6–20)
CALCIUM SERPL-MCNC: 8.6 MG/DL (ref 8.5–10.1)
CHLORIDE SERPL-SCNC: 102 MMOL/L (ref 98–107)
CO2 SERPL-SCNC: 25 MMOL/L (ref 21–32)
CREAT SERPL-MCNC: 1.2 MG/DL (ref 0.7–1.3)
EOSINOPHIL NFR BLD: 0.5 X10^3/UL (ref 0–0.7)
EOSINOPHIL NFR BLD: 3 % (ref 0–3)
ERYTHROCYTE [DISTWIDTH] IN BLOOD BY AUTOMATED COUNT: 13.4 % (ref 11.5–14.5)
GFR SERPLBLD BASED ON 1.73 SQ M-ARVRAT: 65.8 ML/MIN
GLOBULIN SER-MCNC: 4.1 G/DL (ref 2.2–3.8)
GLUCOSE SERPL-MCNC: 221 MG/DL (ref 70–99)
HBA1C MFR BLD: 8.6 % (ref 4.8–5.6)
HCT VFR BLD CALC: 34.3 % (ref 39–53)
HGB BLD-MCNC: 11.5 G/DL (ref 13–17.5)
LYMPHOCYTES # BLD: 2.2 X10^3/UL (ref 1–4.8)
LYMPHOCYTES NFR BLD AUTO: 13 % (ref 24–48)
MCH RBC QN AUTO: 30 PG (ref 25–35)
MCHC RBC AUTO-ENTMCNC: 34 G/DL (ref 31–37)
MCV RBC AUTO: 89 FL (ref 79–100)
MONO #: 1.9 X10^3/UL (ref 0–1.1)
MONOCYTES NFR BLD: 11 % (ref 0–9)
NEUT #: 12.9 X10^3UL (ref 1.8–7.7)
NEUTROPHILS NFR BLD AUTO: 73 % (ref 31–73)
PLATELET # BLD AUTO: 262 X10^3/UL (ref 140–400)
POTASSIUM SERPL-SCNC: 4.8 MMOL/L (ref 3.5–5.1)
PROT SERPL-MCNC: 6.5 G/DL (ref 6.4–8.2)
RBC # BLD AUTO: 3.86 X10^6/UL (ref 4.3–5.7)
SODIUM SERPL-SCNC: 139 MMOL/L (ref 136–145)
WBC # BLD AUTO: 17.5 X10^3/UL (ref 4–11)

## 2018-10-16 PROCEDURE — 0D9P00Z DRAINAGE OF RECTUM WITH DRAINAGE DEVICE, OPEN APPROACH: ICD-10-PCS | Performed by: SURGERY

## 2018-10-16 RX ADMIN — CELECOXIB SCH MG: 100 CAPSULE ORAL at 09:00

## 2018-10-16 RX ADMIN — CELECOXIB SCH MG: 100 CAPSULE ORAL at 21:44

## 2018-10-16 RX ADMIN — DOCUSATE SODIUM SCH MG: 100 CAPSULE, LIQUID FILLED ORAL at 09:00

## 2018-10-16 RX ADMIN — INSULIN LISPRO SCH UNITS: 100 INJECTION, SOLUTION INTRAVENOUS; SUBCUTANEOUS at 07:47

## 2018-10-16 RX ADMIN — FENTANYL CITRATE PRN MCG: 50 INJECTION INTRAMUSCULAR; INTRAVENOUS at 15:14

## 2018-10-16 RX ADMIN — MORPHINE SULFATE PRN MG: 2 INJECTION, SOLUTION INTRAMUSCULAR; INTRAVENOUS at 11:33

## 2018-10-16 RX ADMIN — HYDROCODONE BITARTRATE AND ACETAMINOPHEN PRN TAB: 5; 325 TABLET ORAL at 21:45

## 2018-10-16 RX ADMIN — LINEZOLID SCH MG: 600 TABLET, FILM COATED ORAL at 21:45

## 2018-10-16 RX ADMIN — INSULIN LISPRO SCH UNITS: 100 INJECTION, SOLUTION INTRAVENOUS; SUBCUTANEOUS at 17:08

## 2018-10-16 RX ADMIN — PIPERACILLIN SODIUM AND TAZOBACTAM SODIUM SCH MLS/HR: 3; .375 INJECTION, POWDER, LYOPHILIZED, FOR SOLUTION INTRAVENOUS at 05:10

## 2018-10-16 RX ADMIN — INSULIN LISPRO SCH UNITS: 100 INJECTION, SOLUTION INTRAVENOUS; SUBCUTANEOUS at 17:09

## 2018-10-16 RX ADMIN — INSULIN LISPRO SCH UNITS: 100 INJECTION, SOLUTION INTRAVENOUS; SUBCUTANEOUS at 07:10

## 2018-10-16 RX ADMIN — PIPERACILLIN SODIUM AND TAZOBACTAM SODIUM SCH MLS/HR: 3; .375 INJECTION, POWDER, LYOPHILIZED, FOR SOLUTION INTRAVENOUS at 19:53

## 2018-10-16 RX ADMIN — HYDROCODONE BITARTRATE AND ACETAMINOPHEN PRN TAB: 5; 325 TABLET ORAL at 07:49

## 2018-10-16 RX ADMIN — PIPERACILLIN SODIUM AND TAZOBACTAM SODIUM SCH MLS/HR: 3; .375 INJECTION, POWDER, LYOPHILIZED, FOR SOLUTION INTRAVENOUS at 11:32

## 2018-10-16 RX ADMIN — Medication SCH CAP: at 21:44

## 2018-10-16 RX ADMIN — Medication SCH CAP: at 09:00

## 2018-10-16 RX ADMIN — DOCUSATE SODIUM SCH MG: 100 CAPSULE, LIQUID FILLED ORAL at 21:45

## 2018-10-16 RX ADMIN — FENTANYL CITRATE PRN MCG: 50 INJECTION INTRAMUSCULAR; INTRAVENOUS at 16:03

## 2018-10-16 RX ADMIN — INSULIN LISPRO SCH UNITS: 100 INJECTION, SOLUTION INTRAVENOUS; SUBCUTANEOUS at 11:48

## 2018-10-16 RX ADMIN — LINEZOLID SCH MG: 600 TABLET, FILM COATED ORAL at 09:00

## 2018-10-16 RX ADMIN — HYDROCODONE BITARTRATE AND ACETAMINOPHEN PRN TAB: 5; 325 TABLET ORAL at 17:04

## 2018-10-16 RX ADMIN — INSULIN GLARGINE SCH UNITS: 100 INJECTION, SOLUTION SUBCUTANEOUS at 21:50

## 2018-10-16 RX ADMIN — INSULIN LISPRO SCH UNITS: 100 INJECTION, SOLUTION INTRAVENOUS; SUBCUTANEOUS at 11:55

## 2018-10-16 RX ADMIN — POLYETHYLENE GLYCOL 3350 SCH GM: 17 POWDER, FOR SOLUTION ORAL at 09:00

## 2018-10-16 RX ADMIN — MORPHINE SULFATE PRN MG: 2 INJECTION, SOLUTION INTRAMUSCULAR; INTRAVENOUS at 21:13

## 2018-10-16 NOTE — OP
DATE OF SURGERY:  10/16/2018



PREOPERATIVE DIAGNOSIS:  Perirectal abscess.



POSTOPERATIVE DIAGNOSIS:  Perirectal abscess.



PROCEDURE:  Incision and drainage.



SURGEON:  Maxx Cruz MD



ANESTHESIA:  General LMA.



ESTIMATED BLOOD LOSS:  5 mL.



INTRAVENOUS FLUIDS:  550.



INDICATIONS:  The patient is a 43-year-old insulin-dependent diabetic with pain,

redness, induration in the left perirectal area tracking up toward the base of

scrotum.  He is brought for I and D.



DESCRIPTION OF PROCEDURE:  The patient brought to the operating suite, given a

general LMA, placed in dorsal lithotomy position, and the perirectal area was

prepped and draped in usual sterile fashion.  Two areas of pointing, one in the

perirectal area and one toward the base of the scrotum were opened and digital

exploration carried out revealing collection of purulent drainage.  The cavity

was cultured.  It was irrigated.  Checked for hemostasis.  When present and a

correct sponge count was obtained, a strip of Surgicel was placed in the wound

and a 5/8 inch Penrose was draped through it and sewn to itself with a 2-0 silk

stitch.  Sterile dressing applied.  The patient taken out of the lithotomy

position, awakened from his anesthetic, and taken to the recovery room in

satisfactory condition.

 



______________________________

MAXX CRUZ MD



DR:  PAPO/lew  JOB#:  [id number] / [id number]

DD:  10/16/2018 14:44  DT:  10/16/2018 15:00

## 2018-10-16 NOTE — PDOC
SURGICAL PROGRESS NOTE


Subjective


continued rectal pain and pressure, no drainage


Vital Signs





Vital Signs








  Date Time  Temp Pulse Resp B/P (MAP) Pulse Ox O2 Delivery O2 Flow Rate FiO2


 


10/16/18 09:01      Room Air  


 


10/16/18 07:00 98.9 88 18 147/84 (105) 96   





 98.9       








I&O











Intake and Output 


 


 10/16/18





 07:00


 


Intake Total 2380 ml


 


Output Total 2300 ml


 


Balance 80 ml


 


 


 


Intake Oral 1280 ml


 


IV Total 1100 ml


 


Output Urine Total 2300 ml








General:  Alert, Oriented X3, Cooperative, No acute distress


Skin:  Other (perirectal area with induration and erythema )


Labs





Laboratory Tests








Test


 10/14/18


10:30 10/14/18


16:21 10/14/18


20:26 10/15/18


03:45


 


White Blood Count


 22.1 x10^3/uL


(4.0-11.0) 


 


 16.8 x10^3/uL


(4.0-11.0)


 


Red Blood Count


 4.28 x10^6/uL


(4.30-5.70) 


 


 3.58 x10^6/uL


(4.30-5.70)


 


Hemoglobin


 12.8 g/dL


(13.0-17.5) 


 


 11.0 g/dL


(13.0-17.5)


 


Hematocrit


 37.7 %


(39.0-53.0) 


 


 31.9 %


(39.0-53.0)


 


Mean Corpuscular Volume 88 fL ()    89 fL () 


 


Mean Corpuscular Hemoglobin 30 pg (25-35)    31 pg (25-35) 


 


Mean Corpuscular Hemoglobin


Concent 34 g/dL


(31-37) 


 


 34 g/dL


(31-37)


 


Red Cell Distribution Width


 13.1 %


(11.5-14.5) 


 


 13.3 %


(11.5-14.5)


 


Platelet Count


 264 x10^3/uL


(140-400) 


 


 205 x10^3/uL


(140-400)


 


Neutrophils (%) (Auto) 79 % (31-73)    75 % (31-73) 


 


Lymphocytes (%) (Auto) 8 % (24-48)    12 % (24-48) 


 


Monocytes (%) (Auto) 12 % (0-9)    11 % (0-9) 


 


Eosinophils (%) (Auto) 1 % (0-3)    2 % (0-3) 


 


Basophils (%) (Auto) 0 % (0-3)    1 % (0-3) 


 


Neutrophils # (Auto)


 17.4 x10^3uL


(1.8-7.7) 


 


 12.6 x10^3uL


(1.8-7.7)


 


Lymphocytes # (Auto)


 1.8 x10^3/uL


(1.0-4.8) 


 


 2.1 x10^3/uL


(1.0-4.8)


 


Monocytes # (Auto)


 2.6 x10^3/uL


(0.0-1.1) 


 


 1.8 x10^3/uL


(0.0-1.1)


 


Eosinophils # (Auto)


 0.2 x10^3/uL


(0.0-0.7) 


 


 0.3 x10^3/uL


(0.0-0.7)


 


Basophils # (Auto)


 0.1 x10^3/uL


(0.0-0.2) 


 


 0.1 x10^3/uL


(0.0-0.2)


 


Segmented Neutrophils % 80 % (35-66)    


 


Band Neutrophils % 4 % (0-9)    


 


Lymphocytes % 9 % (24-48)    


 


Monocytes % 5 % (0-10)    


 


Eosinophils % 2 % (0-5)    


 


Platelet Estimate


 Adequate


(ADEQUATE) 


 


 





 


Erythrocyte Sedimentation Rate 59 (0-15)    


 


Sodium Level


 138 mmol/L


(136-145) 


 


 135 mmol/L


(136-145)


 


Potassium Level


 3.7 mmol/L


(3.5-5.1) 


 


 4.9 mmol/L


(3.5-5.1)


 


Chloride Level


 99 mmol/L


() 


 


 101 mmol/L


()


 


Carbon Dioxide Level


 28 mmol/L


(21-32) 


 


 27 mmol/L


(21-32)


 


Anion Gap 11 (6-14)    7 (6-14) 


 


Blood Urea Nitrogen


 14 mg/dL


(8-26) 


 


 15 mg/dL


(8-26)


 


Creatinine


 1.2 mg/dL


(0.7-1.3) 


 


 1.3 mg/dL


(0.7-1.3)


 


Estimated GFR


(Cockcroft-Gault) 65.8 


 


 


 60.0 





 


BUN/Creatinine Ratio 12 (6-20)    


 


Glucose Level


 224 mg/dL


(70-99) 


 


 366 mg/dL


(70-99)


 


Hemoglobin A1c


 8.6 %


(4.8-5.6) 


 


 





 


Lactic Acid Level


 1.2 mmol/L


(0.4-2.0) 


 


 





 


Calcium Level


 9.5 mg/dL


(8.5-10.1) 


 


 8.5 mg/dL


(8.5-10.1)


 


Total Bilirubin


 0.6 mg/dL


(0.2-1.0) 


 


 





 


Aspartate Amino Transf


(AST/SGOT) 28 U/L (15-37) 


 


 


 





 


Alanine Aminotransferase


(ALT/SGPT) 38 U/L (16-63) 


 


 


 





 


Alkaline Phosphatase


 101 U/L


() 


 


 





 


Total Protein


 7.4 g/dL


(6.4-8.2) 


 


 





 


Albumin


 3.2 g/dL


(3.4-5.0) 


 


 





 


Albumin/Globulin Ratio 0.8 (1.0-1.7)    


 


Glucose (Fingerstick)


 


 235 mg/dL


(70-99) 231 mg/dL


(70-99) 





 


Test


 10/15/18


07:47 10/15/18


11:17 10/15/18


16:51 10/15/18


20:46


 


Glucose (Fingerstick)


 308 mg/dL


(70-99) 211 mg/dL


(70-99) 86 mg/dL


(70-99) 76 mg/dL


(70-99)


 


Test


 10/16/18


04:20 10/16/18


06:57 


 





 


White Blood Count


 17.5 x10^3/uL


(4.0-11.0) 


 


 





 


Red Blood Count


 3.86 x10^6/uL


(4.30-5.70) 


 


 





 


Hemoglobin


 11.5 g/dL


(13.0-17.5) 


 


 





 


Hematocrit


 34.3 %


(39.0-53.0) 


 


 





 


Mean Corpuscular Volume 89 fL ()    


 


Mean Corpuscular Hemoglobin 30 pg (25-35)    


 


Mean Corpuscular Hemoglobin


Concent 34 g/dL


(31-37) 


 


 





 


Red Cell Distribution Width


 13.4 %


(11.5-14.5) 


 


 





 


Platelet Count


 262 x10^3/uL


(140-400) 


 


 





 


Neutrophils (%) (Auto) 73 % (31-73)    


 


Lymphocytes (%) (Auto) 13 % (24-48)    


 


Monocytes (%) (Auto) 11 % (0-9)    


 


Eosinophils (%) (Auto) 3 % (0-3)    


 


Basophils (%) (Auto) 0 % (0-3)    


 


Neutrophils # (Auto)


 12.9 x10^3uL


(1.8-7.7) 


 


 





 


Lymphocytes # (Auto)


 2.2 x10^3/uL


(1.0-4.8) 


 


 





 


Monocytes # (Auto)


 1.9 x10^3/uL


(0.0-1.1) 


 


 





 


Eosinophils # (Auto)


 0.5 x10^3/uL


(0.0-0.7) 


 


 





 


Basophils # (Auto)


 0.1 x10^3/uL


(0.0-0.2) 


 


 





 


Sodium Level


 139 mmol/L


(136-145) 


 


 





 


Potassium Level


 4.8 mmol/L


(3.5-5.1) 


 


 





 


Chloride Level


 102 mmol/L


() 


 


 





 


Carbon Dioxide Level


 25 mmol/L


(21-32) 


 


 





 


Anion Gap 12 (6-14)    


 


Blood Urea Nitrogen


 16 mg/dL


(8-26) 


 


 





 


Creatinine


 1.2 mg/dL


(0.7-1.3) 


 


 





 


Estimated GFR


(Cockcroft-Gault) 65.8 


 


 


 





 


BUN/Creatinine Ratio 13 (6-20)    


 


Glucose Level


 221 mg/dL


(70-99) 


 


 





 


Calcium Level


 8.6 mg/dL


(8.5-10.1) 


 


 





 


Total Bilirubin


 0.9 mg/dL


(0.2-1.0) 


 


 





 


Aspartate Amino Transf


(AST/SGOT) 232 U/L


(15-37) 


 


 





 


Alanine Aminotransferase


(ALT/SGPT) 188 U/L


(16-63) 


 


 





 


Alkaline Phosphatase


 165 U/L


() 


 


 





 


Total Protein


 6.5 g/dL


(6.4-8.2) 


 


 





 


Albumin


 2.4 g/dL


(3.4-5.0) 


 


 





 


Albumin/Globulin Ratio 0.6 (1.0-1.7)    


 


Glucose (Fingerstick)


 


 273 mg/dL


(70-99) 


 











Laboratory Tests








Test


 10/15/18


11:17 10/15/18


16:51 10/15/18


20:46 10/16/18


04:20


 


Glucose (Fingerstick)


 211 mg/dL


(70-99) 86 mg/dL


(70-99) 76 mg/dL


(70-99) 





 


White Blood Count


 


 


 


 17.5 x10^3/uL


(4.0-11.0)


 


Red Blood Count


 


 


 


 3.86 x10^6/uL


(4.30-5.70)


 


Hemoglobin


 


 


 


 11.5 g/dL


(13.0-17.5)


 


Hematocrit


 


 


 


 34.3 %


(39.0-53.0)


 


Mean Corpuscular Volume    89 fL () 


 


Mean Corpuscular Hemoglobin    30 pg (25-35) 


 


Mean Corpuscular Hemoglobin


Concent 


 


 


 34 g/dL


(31-37)


 


Red Cell Distribution Width


 


 


 


 13.4 %


(11.5-14.5)


 


Platelet Count


 


 


 


 262 x10^3/uL


(140-400)


 


Neutrophils (%) (Auto)    73 % (31-73) 


 


Lymphocytes (%) (Auto)    13 % (24-48) 


 


Monocytes (%) (Auto)    11 % (0-9) 


 


Eosinophils (%) (Auto)    3 % (0-3) 


 


Basophils (%) (Auto)    0 % (0-3) 


 


Neutrophils # (Auto)


 


 


 


 12.9 x10^3uL


(1.8-7.7)


 


Lymphocytes # (Auto)


 


 


 


 2.2 x10^3/uL


(1.0-4.8)


 


Monocytes # (Auto)


 


 


 


 1.9 x10^3/uL


(0.0-1.1)


 


Eosinophils # (Auto)


 


 


 


 0.5 x10^3/uL


(0.0-0.7)


 


Basophils # (Auto)


 


 


 


 0.1 x10^3/uL


(0.0-0.2)


 


Sodium Level


 


 


 


 139 mmol/L


(136-145)


 


Potassium Level


 


 


 


 4.8 mmol/L


(3.5-5.1)


 


Chloride Level


 


 


 


 102 mmol/L


()


 


Carbon Dioxide Level


 


 


 


 25 mmol/L


(21-32)


 


Anion Gap    12 (6-14) 


 


Blood Urea Nitrogen


 


 


 


 16 mg/dL


(8-26)


 


Creatinine


 


 


 


 1.2 mg/dL


(0.7-1.3)


 


Estimated GFR


(Cockcroft-Gault) 


 


 


 65.8 





 


BUN/Creatinine Ratio    13 (6-20) 


 


Glucose Level


 


 


 


 221 mg/dL


(70-99)


 


Calcium Level


 


 


 


 8.6 mg/dL


(8.5-10.1)


 


Total Bilirubin


 


 


 


 0.9 mg/dL


(0.2-1.0)


 


Aspartate Amino Transf


(AST/SGOT) 


 


 


 232 U/L


(15-37)


 


Alanine Aminotransferase


(ALT/SGPT) 


 


 


 188 U/L


(16-63)


 


Alkaline Phosphatase


 


 


 


 165 U/L


()


 


Total Protein


 


 


 


 6.5 g/dL


(6.4-8.2)


 


Albumin


 


 


 


 2.4 g/dL


(3.4-5.0)


 


Albumin/Globulin Ratio    0.6 (1.0-1.7) 


 


Test


 10/16/18


06:57 


 


 





 


Glucose (Fingerstick)


 273 mg/dL


(70-99) 


 


 











Problem List


Problems


Medical Problems:


(1) Cellulitis


Status: Acute  








Assessment/Plan


await us results











DELBERT RICHARDS Oct 16, 2018 09:42

## 2018-10-16 NOTE — PDOC
PROGRESS NOTES


Chief Complaint


Chief Complaint


 


Left perineal cellulitis- 


Sepsis  


Diabetes 1, insulin req.  with unknown hemoglobin A1c -  


rectal pain, cannot void








History of Present Illness


History of Present Illness


broad abx from Id consult


to surg today,  discussed with Dr. orozco





US showed no definite abcess


 


pain control OK


check bladder scan





Vitals


Vitals





Vital Signs








  Date Time  Temp Pulse Resp B/P (MAP) Pulse Ox O2 Delivery O2 Flow Rate FiO2


 


10/16/18 15:14   22  99 Nasal Cannula  


 


10/16/18 14:51  101  167/84   4 


 


10/16/18 14:12 98.0       





 98.0       











Physical Exam


General:  Alert, Oriented X3, Cooperative, No acute distress


Heart:  Regular rate


Abdomen:  Soft


Extremities:  No clubbing, No cyanosis, No edema, Normal pulses, No tenderness/

swelling


Skin:  Other (perirectal area with induration and erythema )





Labs


LABS





Laboratory Tests








Test


 10/15/18


16:51 10/15/18


20:46 10/16/18


04:20 10/16/18


06:57


 


Glucose (Fingerstick)


 86 mg/dL


(70-99) 76 mg/dL


(70-99) 


 273 mg/dL


(70-99)


 


White Blood Count


 


 


 17.5 x10^3/uL


(4.0-11.0) 





 


Red Blood Count


 


 


 3.86 x10^6/uL


(4.30-5.70) 





 


Hemoglobin


 


 


 11.5 g/dL


(13.0-17.5) 





 


Hematocrit


 


 


 34.3 %


(39.0-53.0) 





 


Mean Corpuscular Volume   89 fL ()  


 


Mean Corpuscular Hemoglobin   30 pg (25-35)  


 


Mean Corpuscular Hemoglobin


Concent 


 


 34 g/dL


(31-37) 





 


Red Cell Distribution Width


 


 


 13.4 %


(11.5-14.5) 





 


Platelet Count


 


 


 262 x10^3/uL


(140-400) 





 


Neutrophils (%) (Auto)   73 % (31-73)  


 


Lymphocytes (%) (Auto)   13 % (24-48)  


 


Monocytes (%) (Auto)   11 % (0-9)  


 


Eosinophils (%) (Auto)   3 % (0-3)  


 


Basophils (%) (Auto)   0 % (0-3)  


 


Neutrophils # (Auto)


 


 


 12.9 x10^3uL


(1.8-7.7) 





 


Lymphocytes # (Auto)


 


 


 2.2 x10^3/uL


(1.0-4.8) 





 


Monocytes # (Auto)


 


 


 1.9 x10^3/uL


(0.0-1.1) 





 


Eosinophils # (Auto)


 


 


 0.5 x10^3/uL


(0.0-0.7) 





 


Basophils # (Auto)


 


 


 0.1 x10^3/uL


(0.0-0.2) 





 


Sodium Level


 


 


 139 mmol/L


(136-145) 





 


Potassium Level


 


 


 4.8 mmol/L


(3.5-5.1) 





 


Chloride Level


 


 


 102 mmol/L


() 





 


Carbon Dioxide Level


 


 


 25 mmol/L


(21-32) 





 


Anion Gap   12 (6-14)  


 


Blood Urea Nitrogen


 


 


 16 mg/dL


(8-26) 





 


Creatinine


 


 


 1.2 mg/dL


(0.7-1.3) 





 


Estimated GFR


(Cockcroft-Gault) 


 


 65.8 


 





 


BUN/Creatinine Ratio   13 (6-20)  


 


Glucose Level


 


 


 221 mg/dL


(70-99) 





 


Calcium Level


 


 


 8.6 mg/dL


(8.5-10.1) 





 


Total Bilirubin


 


 


 0.9 mg/dL


(0.2-1.0) 





 


Aspartate Amino Transf


(AST/SGOT) 


 


 232 U/L


(15-37) 





 


Alanine Aminotransferase


(ALT/SGPT) 


 


 188 U/L


(16-63) 





 


Alkaline Phosphatase


 


 


 165 U/L


() 





 


Total Protein


 


 


 6.5 g/dL


(6.4-8.2) 





 


Albumin


 


 


 2.4 g/dL


(3.4-5.0) 





 


Albumin/Globulin Ratio   0.6 (1.0-1.7)  


 


Test


 10/16/18


11:44 10/16/18


15:01 


 





 


Glucose (Fingerstick)


 241 mg/dL


(70-99) 224 mg/dL


(70-99) 


 














Assessment and Plan


Assessmemt and Plan


Problems


Medical Problems:


(1) Cellulitis


Status: Acute  











Comment


Review of Relevant


I have reviewed the following items itz (where applicable) has been applied.


Labs





Laboratory Tests








Test


 10/14/18


16:21 10/14/18


20:26 10/15/18


03:45 10/15/18


07:47


 


Glucose (Fingerstick)


 235 mg/dL


(70-99) 231 mg/dL


(70-99) 


 308 mg/dL


(70-99)


 


White Blood Count


 


 


 16.8 x10^3/uL


(4.0-11.0) 





 


Red Blood Count


 


 


 3.58 x10^6/uL


(4.30-5.70) 





 


Hemoglobin


 


 


 11.0 g/dL


(13.0-17.5) 





 


Hematocrit


 


 


 31.9 %


(39.0-53.0) 





 


Mean Corpuscular Volume   89 fL ()  


 


Mean Corpuscular Hemoglobin   31 pg (25-35)  


 


Mean Corpuscular Hemoglobin


Concent 


 


 34 g/dL


(31-37) 





 


Red Cell Distribution Width


 


 


 13.3 %


(11.5-14.5) 





 


Platelet Count


 


 


 205 x10^3/uL


(140-400) 





 


Neutrophils (%) (Auto)   75 % (31-73)  


 


Lymphocytes (%) (Auto)   12 % (24-48)  


 


Monocytes (%) (Auto)   11 % (0-9)  


 


Eosinophils (%) (Auto)   2 % (0-3)  


 


Basophils (%) (Auto)   1 % (0-3)  


 


Neutrophils # (Auto)


 


 


 12.6 x10^3uL


(1.8-7.7) 





 


Lymphocytes # (Auto)


 


 


 2.1 x10^3/uL


(1.0-4.8) 





 


Monocytes # (Auto)


 


 


 1.8 x10^3/uL


(0.0-1.1) 





 


Eosinophils # (Auto)


 


 


 0.3 x10^3/uL


(0.0-0.7) 





 


Basophils # (Auto)


 


 


 0.1 x10^3/uL


(0.0-0.2) 





 


Sodium Level


 


 


 135 mmol/L


(136-145) 





 


Potassium Level


 


 


 4.9 mmol/L


(3.5-5.1) 





 


Chloride Level


 


 


 101 mmol/L


() 





 


Carbon Dioxide Level


 


 


 27 mmol/L


(21-32) 





 


Anion Gap   7 (6-14)  


 


Blood Urea Nitrogen


 


 


 15 mg/dL


(8-26) 





 


Creatinine


 


 


 1.3 mg/dL


(0.7-1.3) 





 


Estimated GFR


(Cockcroft-Gault) 


 


 60.0 


 





 


Glucose Level


 


 


 366 mg/dL


(70-99) 





 


Calcium Level


 


 


 8.5 mg/dL


(8.5-10.1) 





 


Test


 10/15/18


11:17 10/15/18


16:51 10/15/18


20:46 10/16/18


04:20


 


Glucose (Fingerstick)


 211 mg/dL


(70-99) 86 mg/dL


(70-99) 76 mg/dL


(70-99) 





 


White Blood Count


 


 


 


 17.5 x10^3/uL


(4.0-11.0)


 


Red Blood Count


 


 


 


 3.86 x10^6/uL


(4.30-5.70)


 


Hemoglobin


 


 


 


 11.5 g/dL


(13.0-17.5)


 


Hematocrit


 


 


 


 34.3 %


(39.0-53.0)


 


Mean Corpuscular Volume    89 fL () 


 


Mean Corpuscular Hemoglobin    30 pg (25-35) 


 


Mean Corpuscular Hemoglobin


Concent 


 


 


 34 g/dL


(31-37)


 


Red Cell Distribution Width


 


 


 


 13.4 %


(11.5-14.5)


 


Platelet Count


 


 


 


 262 x10^3/uL


(140-400)


 


Neutrophils (%) (Auto)    73 % (31-73) 


 


Lymphocytes (%) (Auto)    13 % (24-48) 


 


Monocytes (%) (Auto)    11 % (0-9) 


 


Eosinophils (%) (Auto)    3 % (0-3) 


 


Basophils (%) (Auto)    0 % (0-3) 


 


Neutrophils # (Auto)


 


 


 


 12.9 x10^3uL


(1.8-7.7)


 


Lymphocytes # (Auto)


 


 


 


 2.2 x10^3/uL


(1.0-4.8)


 


Monocytes # (Auto)


 


 


 


 1.9 x10^3/uL


(0.0-1.1)


 


Eosinophils # (Auto)


 


 


 


 0.5 x10^3/uL


(0.0-0.7)


 


Basophils # (Auto)


 


 


 


 0.1 x10^3/uL


(0.0-0.2)


 


Sodium Level


 


 


 


 139 mmol/L


(136-145)


 


Potassium Level


 


 


 


 4.8 mmol/L


(3.5-5.1)


 


Chloride Level


 


 


 


 102 mmol/L


()


 


Carbon Dioxide Level


 


 


 


 25 mmol/L


(21-32)


 


Anion Gap    12 (6-14) 


 


Blood Urea Nitrogen


 


 


 


 16 mg/dL


(8-26)


 


Creatinine


 


 


 


 1.2 mg/dL


(0.7-1.3)


 


Estimated GFR


(Cockcroft-Gault) 


 


 


 65.8 





 


BUN/Creatinine Ratio    13 (6-20) 


 


Glucose Level


 


 


 


 221 mg/dL


(70-99)


 


Calcium Level


 


 


 


 8.6 mg/dL


(8.5-10.1)


 


Total Bilirubin


 


 


 


 0.9 mg/dL


(0.2-1.0)


 


Aspartate Amino Transf


(AST/SGOT) 


 


 


 232 U/L


(15-37)


 


Alanine Aminotransferase


(ALT/SGPT) 


 


 


 188 U/L


(16-63)


 


Alkaline Phosphatase


 


 


 


 165 U/L


()


 


Total Protein


 


 


 


 6.5 g/dL


(6.4-8.2)


 


Albumin


 


 


 


 2.4 g/dL


(3.4-5.0)


 


Albumin/Globulin Ratio    0.6 (1.0-1.7) 


 


Test


 10/16/18


06:57 10/16/18


11:44 10/16/18


15:01 





 


Glucose (Fingerstick)


 273 mg/dL


(70-99) 241 mg/dL


(70-99) 224 mg/dL


(70-99) 











Laboratory Tests








Test


 10/15/18


16:51 10/15/18


20:46 10/16/18


04:20 10/16/18


06:57


 


Glucose (Fingerstick)


 86 mg/dL


(70-99) 76 mg/dL


(70-99) 


 273 mg/dL


(70-99)


 


White Blood Count


 


 


 17.5 x10^3/uL


(4.0-11.0) 





 


Red Blood Count


 


 


 3.86 x10^6/uL


(4.30-5.70) 





 


Hemoglobin


 


 


 11.5 g/dL


(13.0-17.5) 





 


Hematocrit


 


 


 34.3 %


(39.0-53.0) 





 


Mean Corpuscular Volume   89 fL ()  


 


Mean Corpuscular Hemoglobin   30 pg (25-35)  


 


Mean Corpuscular Hemoglobin


Concent 


 


 34 g/dL


(31-37) 





 


Red Cell Distribution Width


 


 


 13.4 %


(11.5-14.5) 





 


Platelet Count


 


 


 262 x10^3/uL


(140-400) 





 


Neutrophils (%) (Auto)   73 % (31-73)  


 


Lymphocytes (%) (Auto)   13 % (24-48)  


 


Monocytes (%) (Auto)   11 % (0-9)  


 


Eosinophils (%) (Auto)   3 % (0-3)  


 


Basophils (%) (Auto)   0 % (0-3)  


 


Neutrophils # (Auto)


 


 


 12.9 x10^3uL


(1.8-7.7) 





 


Lymphocytes # (Auto)


 


 


 2.2 x10^3/uL


(1.0-4.8) 





 


Monocytes # (Auto)


 


 


 1.9 x10^3/uL


(0.0-1.1) 





 


Eosinophils # (Auto)


 


 


 0.5 x10^3/uL


(0.0-0.7) 





 


Basophils # (Auto)


 


 


 0.1 x10^3/uL


(0.0-0.2) 





 


Sodium Level


 


 


 139 mmol/L


(136-145) 





 


Potassium Level


 


 


 4.8 mmol/L


(3.5-5.1) 





 


Chloride Level


 


 


 102 mmol/L


() 





 


Carbon Dioxide Level


 


 


 25 mmol/L


(21-32) 





 


Anion Gap   12 (6-14)  


 


Blood Urea Nitrogen


 


 


 16 mg/dL


(8-26) 





 


Creatinine


 


 


 1.2 mg/dL


(0.7-1.3) 





 


Estimated GFR


(Cockcroft-Gault) 


 


 65.8 


 





 


BUN/Creatinine Ratio   13 (6-20)  


 


Glucose Level


 


 


 221 mg/dL


(70-99) 





 


Calcium Level


 


 


 8.6 mg/dL


(8.5-10.1) 





 


Total Bilirubin


 


 


 0.9 mg/dL


(0.2-1.0) 





 


Aspartate Amino Transf


(AST/SGOT) 


 


 232 U/L


(15-37) 





 


Alanine Aminotransferase


(ALT/SGPT) 


 


 188 U/L


(16-63) 





 


Alkaline Phosphatase


 


 


 165 U/L


() 





 


Total Protein


 


 


 6.5 g/dL


(6.4-8.2) 





 


Albumin


 


 


 2.4 g/dL


(3.4-5.0) 





 


Albumin/Globulin Ratio   0.6 (1.0-1.7)  


 


Test


 10/16/18


11:44 10/16/18


15:01 


 





 


Glucose (Fingerstick)


 241 mg/dL


(70-99) 224 mg/dL


(70-99) 


 











Microbiology


10/14/18 Blood Culture - Preliminary, Resulted


           NO GROWTH AFTER 2 DAYS


Medications





Current Medications


Fentanyl Citrate (Fentanyl 2ml Vial) 25 mcg PRN Q15MIN  PRN IV PAIN GREATER 

THAN 3/10 Last administered on 10/14/18at 14:14;  Start 10/14/18 at 10:30;  

Stop 10/15/18 at 10:29;  Status DC


Sodium Chloride 1,000 ml @  1,000 mls/hr Q1H IV  Last administered on 10/14/

18at 10:39;  Start 10/14/18 at 10:22;  Stop 10/14/18 at 11:21;  Status DC


Ondansetron HCl (Zofran) 4 mg 1X  ONCE IV  Last administered on 10/14/18at 10:45

;  Start 10/14/18 at 11:00;  Stop 10/14/18 at 11:01;  Status DC


Vancomycin HCl 250 ml @  250 mls/hr 1X  ONCE IV ;  Start 10/14/18 at 10:30;  

Stop 10/14/18 at 10:31;  Status DC


Piperacillin Sod/ Tazobactam Sod 3.375 gm/Sodium Chloride 50 ml @  100 mls/hr 

1X  ONCE IV  Last administered on 10/14/18at 11:04;  Start 10/14/18 at 11:00;  

Stop 10/14/18 at 11:29;  Status DC


Vancomycin HCl 1.75 gm/Sodium Chloride 500 ml @  250 mls/hr 1X  ONCE IV  Last 

administered on 10/14/18at 11:05;  Start 10/14/18 at 11:30;  Stop 10/14/18 at 13

:29;  Status DC


Iohexol (Omnipaque 300 Mg/ml) 75 ml 1X  ONCE IV ;  Start 10/14/18 at 11:45;  

Stop 10/14/18 at 11:48;  Status DC


Info (CONTRAST GIVEN -- Rx MONITORING) 1 each PRN DAILY  PRN MC SEE COMMENTS;  

Start 10/14/18 at 12:00;  Stop 10/16/18 at 11:59;  Status DC


Ondansetron HCl (Zofran) 4 mg PRN Q8HRS  PRN IV NAUSEA/VOMITING;  Start 10/14/

18 at 12:45;  Stop 10/14/18 at 12:55;  Status DC


Morphine Sulfate (Morphine Sulfate) 4 mg PRN Q2HR  PRN IV PAIN Last 

administered on 10/14/18at 17:32;  Start 10/14/18 at 12:45;  Stop 10/15/18 at 12

:44;  Status DC


Sodium Chloride 1,000 ml @  75 mls/hr [id number] IV  Last administered on 10/15/

18at 05:52;  Start 10/14/18 at 12:32;  Stop 10/15/18 at 12:31;  Status DC


Ondansetron HCl (Zofran) 4 mg PRN Q6HRS  PRN IV NAUSEA/VOMITING Last 

administered on 10/16/18at 07:08;  Start 10/14/18 at 13:00


Celecoxib (CeleBREX) 100 mg BID PO  Last administered on 10/15/18at 20:03;  

Start 10/14/18 at 21:00


Acetaminophen/ Hydrocodone Bitart (Lortab 5/325) 1 tab PRN Q4HRS  PRN PO PAIN 

Last administered on 10/16/18at 07:49;  Start 10/14/18 at 13:00


Diphenhydramine HCl (Benadryl) 25 mg PRN QHS  PRN PO INSOMNIA Last administered 

on 10/15/18at 21:54;  Start 10/14/18 at 13:00


Insulin Human Lispro (HumaLOG) 0-9 UNITS TIDWMEALS SQ  Last administered on 10/

16/18at 11:55;  Start 10/14/18 at 17:00


Dextrose (Dextrose 50%-Water Syringe) 12.5 gm PRN Q15MIN  PRN IV SEE COMMENTS;  

Start 10/14/18 at 13:00


Insulin Glargine (Lantus) 18 units QHS SQ  Last administered on 10/14/18at 20:57

;  Start 10/14/18 at 21:00;  Stop 10/15/18 at 10:13;  Status DC


Insulin Human Lispro (HumaLOG) 15 units TIDWMEALS SQ  Last administered on 10/15

/18at 09:19;  Start 10/14/18 at 17:00;  Stop 10/15/18 at 10:13;  Status DC


Acetaminophen/ Hydrocodone Bitart (Lortab 5/325) 1 tab PRN Q6HRS  PRN PO PAIN;  

Start 10/14/18 at 13:00;  Status UNV


Clindamycin Phosphate 50 ml @  100 mls/hr Q8HRS IV  Last administered on 10/15/

18at 05:52;  Start 10/14/18 at 22:00;  Stop 10/15/18 at 11:28;  Status DC


Acetaminophen (Tylenol) 650 mg PRN Q6HRS  PRN PO FEVER Last administered on 10/

15/18at 17:57;  Start 10/14/18 at 21:15


Docusate Sodium (Colace) 100 mg BID PO  Last administered on 10/15/18at 20:03;  

Start 10/14/18 at 22:00


Polyethylene Glycol (miraLAX PACKET) 17 gm DAILY PO  Last administered on 10/15/

18at 09:09;  Start 10/14/18 at 22:00


Magnesium Hydroxide (Milk Of Magnesia) 2,400 mg PRN DAILY  PRN PO CONSTIPATION 

1ST CHOICE;  Start 10/14/18 at 21:15


Influenza Virus Vaccine (Afluria Trivalent [id number] Syringe) 0.5 ml ONCE ONCE 

VAX IM ;  Start 10/15/18 at 09:00;  Stop 10/15/18 at 09:01;  Status DC


Insulin Glargine (Lantus) 22 units QHS SQ ;  Start 10/15/18 at 21:00;  Stop 10/

15/18 at 21:00;  Status DC


Insulin Human Lispro (HumaLOG) 20 units TIDWMEALS SQ  Last administered on 10/15

/18at 17:59;  Start 10/15/18 at 12:00


Insulin Glargine (Lantus) 18 units QHS SQ ;  Start 10/15/18 at 21:00


Piperacillin Sod/ Tazobactam Sod 3.375 gm/Sodium Chloride 50 ml @  100 mls/hr 

Q6HRS IV  Last administered on 10/16/18at 11:32;  Start 10/15/18 at 12:00


Linezolid (Zyvox) 600 mg BID PO  Last administered on 10/15/18at 21:54;  Start 

10/15/18 at 12:00


Lactobacillus Rhamnosus (Culturelle) 1 cap BID PO  Last administered on 10/15/

18at 20:03;  Start 10/15/18 at 12:00


Ondansetron HCl (Zofran) 4 mg PRN Q6HRS  PRN IV NAUSEA/VOMITING;  Start 10/16/

18 at 07:00;  Stop 10/17/18 at 06:59


Fentanyl Citrate (Fentanyl 2ml Vial) 25 mcg PRN Q5MIN  PRN IV MILD PAIN;  Start 

10/16/18 at 07:00;  Stop 10/17/18 at 06:59


Fentanyl Citrate (Fentanyl 2ml Vial) 50 mcg PRN Q5MIN  PRN IV MODERATE TO 

SEVERE PAIN Last administered on 10/16/18at 15:14;  Start 10/16/18 at 07:00;  

Stop 10/17/18 at 06:59


Morphine Sulfate (Morphine Sulfate) 1 mg PRN Q10MIN  PRN IV SEVERE PAIN;  Start 

10/16/18 at 07:00;  Stop 10/17/18 at 06:59


Ringer's Solution 1,000 ml @  30 mls/hr Q24H IV  Last administered on 10/16/

18at 12:18;  Start 10/16/18 at 07:00;  Stop 10/16/18 at 18:59


Lidocaine HCl (Xylocaine-Mpf 1% 2ml Vial) 2 ml PRN 1X  PRN ID PRIOR TO IV START

;  Start 10/16/18 at 07:00;  Stop 10/17/18 at 06:59


Hydromorphone HCl (Dilaudid) 0.5 mg PRN Q10MIN  PRN IV SEV PAIN, Second choice;

  Start 10/16/18 at 07:00;  Stop 10/17/18 at 06:59


Prochlorperazine Edisylate (Compazine) 5 mg PACU PRN  PRN IV NAUSEA, MRX1;  

Start 10/16/18 at 07:00;  Stop 10/17/18 at 06:59


Morphine Sulfate (Morphine Sulfate) 2 mg PRN Q2HR  PRN IV PAIN SEVERE Last 

administered on 10/16/18at 11:33;  Start 10/16/18 at 10:45


Propofol 20 ml @ As Directed STK-MED ONCE IV ;  Start 10/16/18 at 11:55;  Stop 

10/16/18 at 11:56;  Status DC


Dexamethasone Sodium Phosphate (Decadron) 20 mg STK-MED ONCE .ROUTE ;  Start 10/

16/18 at 11:55;  Stop 10/16/18 at 11:56;  Status DC


Lidocaine HCl (Lidocaine Pf 2% Vial) 5 ml STK-MED ONCE .ROUTE ;  Start 10/16/18 

at 11:55;  Stop 10/16/18 at 11:56;  Status DC


Ondansetron HCl (Zofran) 4 mg STK-MED ONCE .ROUTE ;  Start 10/16/18 at 11:55;  

Stop 10/16/18 at 11:56;  Status DC


Fentanyl Citrate (Fentanyl 2ml Vial) 100 mcg STK-MED ONCE .ROUTE ;  Start 10/16/

18 at 11:55;  Stop 10/16/18 at 11:56;  Status DC


Midazolam HCl (Versed) 2 mg STK-MED ONCE .ROUTE ;  Start 10/16/18 at 12:00;  

Stop 10/16/18 at 12:01;  Status DC


Bupivacaine HCl/ Epinephrine Bitart (Sensorcain-Mpf Epi 0.5%-1:[id number]) 30 ml STK

-MED ONCE .ROUTE  Last administered on 10/16/18at 13:49;  Start 10/16/18 at 12:

26;  Stop 10/16/18 at 13:27;  Status DC


Esmolol HCl (Brevibloc) 100 mg STK-MED ONCE IV ;  Start 10/16/18 at 13:41;  

Stop 10/16/18 at 13:42;  Status DC


Cellulose (Surgicel Hemostat 2x3) 1 each STK-MED ONCE .ROUTE  Last administered 

on 10/16/18at 14:03;  Start 10/16/18 at 12:48;  Stop 10/16/18 at 13:48;  Status 

DC


Sevoflurane (Ultane) 30 ml STK-MED ONCE IH ;  Start 10/16/18 at 13:51;  Stop 10/

16/18 at 13:52;  Status DC


Epinephrine HCl (Adrenalin) 30 mg STK-MED ONCE .ROUTE  Last administered on 10/

16/18at 14:04;  Start 10/16/18 at 12:57;  Stop 10/16/18 at 13:57;  Status DC


Epinephrine HCl (EPINEPHrine SYRINGE) 1 mg STK-MED ONCE .ROUTE ;  Start 10/16/

18 at 13:59;  Stop 10/16/18 at 14:00;  Status DC


Albuterol Sulfate (Ventolin Neb Soln) 2.5 mg 1X  ONCE NEB  Last administered on 

10/16/18at 14:32;  Start 10/16/18 at 14:30;  Stop 10/16/18 at 14:31;  Status DC


Furosemide (Lasix) 10 mg 1X  ONCE IVP  Last administered on 10/16/18at 14:43;  

Start 10/16/18 at 14:45;  Stop 10/16/18 at 14:46;  Status DC





Active Scripts


Active


Norco 5-325 Tablet (Acetaminophen/Hydrocodone Bitart) 1 Each Tablet 1 Tab PO 

PRN Q6HRS PRN


Bactrim Ds Tablet (Sulfamethoxazole/Trimethoprim) 1 Each Tablet 1 Tab PO BID


Reported


Humalog (Insulin Lispro) 100 Unit/1 Ml Cartridge 100 Unit SQ 


Lantus Solostar (Insulin Glargine,Hum.rec.anlog) 100 Unit/1 Ml Insuln.pen 10 

Unit SQ QHS


Vitals/I & O





Vital Sign - Last 24 Hours








 10/15/18 10/15/18 10/15/18 10/15/18





 19:00 20:00 20:03 21:03


 


Temp 99.9   





 99.9   


 


Pulse 98   


 


Resp 18  18 18


 


B/P (MAP) 145/80 (101)   


 


Pulse Ox 96  91 91


 


O2 Delivery Room Air Room Air Room Air 


 


    





    





 10/15/18 10/16/18 10/16/18 10/16/18





 23:00 03:00 07:00 07:49


 


Temp 97.7 98.2 98.9 





 97.7 98.2 98.9 


 


Pulse 89 93 88 


 


Resp 18 18 18 


 


B/P (MAP) 116/69 (85) 152/97 (115) 147/84 (105) 


 


Pulse Ox 93 94 96 


 


O2 Delivery Room Air Room Air Room Air Room Air


 


    





    





 10/16/18 10/16/18 10/16/18 10/16/18





 08:30 09:01 11:00 11:33


 


Temp   98.2 





   98.2 


 


Pulse   93 


 


Resp   18 


 


B/P (MAP)   148/78 (101) 


 


Pulse Ox   95 


 


O2 Delivery Room Air Room Air Room Air Room Air


 


    





    





 10/16/18 10/16/18 10/16/18 10/16/18





 14:12 14:12 14:27 14:35


 


Temp 98.0   





 98.0   


 


Pulse 103  100 


 


Resp 28  20 


 


B/P (MAP) 165/91  156/91 


 


Pulse Ox 97  95 92


 


O2 Delivery Simple Mask Mask Simple Mask Nasal Cannula


 


O2 Flow Rate 10 10 10 4.0


 


    





    





 10/16/18 10/16/18  





 14:51 15:14  


 


Pulse 101   


 


Resp 32 22  


 


B/P (MAP) 167/84   


 


Pulse Ox 97 99  


 


O2 Delivery Nasal Cannula Nasal Cannula  


 


O2 Flow Rate 4   














Intake and Output   


 


 10/15/18 10/15/18 10/16/18





 15:00 23:00 07:00


 


Intake Total 240 ml 290 ml 1850 ml


 


Output Total 300 ml 1000 ml 1000 ml


 


Balance -60 ml -710 ml 850 ml

















MICHELLE ZAMORA MD Oct 16, 2018 15:23

## 2018-10-16 NOTE — RAD
EXAM: Perirectal sonogram.

 

HISTORY: Cellulitis.

 

TECHNIQUE: Sonographic imaging of the left perirectal soft tissues of the 

site of cellulitis was performed.

 

COMPARISON: None.

 

FINDINGS: There is no sonographic evidence of abscess. There is soft 

tissue edema and hyperemia consistent with reported cellulitis.

 

IMPRESSION: Soft tissue edema and hyperemia consistent with cellulitis. No

abscess is seen.

 

Electronically signed by: Jory Hanna MD (10/16/2018 11:06 AM) 

[place] [id number]

## 2018-10-16 NOTE — RAD
CHEST AP ONLY

 

Clinical indications: Short of breath, possible pulmonary edema.

 

. Comparison: None available.

 

Findings: Small right-sided pleural effusion is seen with mild blunting of

the right lateral lateral costophrenic angle. Mild right lung base 

infiltrate or atelectasis is seen. The left lung field is clear. No 

parahilar pulmonary edema is seen. The heart size, pulmonary vasculature, 

mediastinum and both gabby are unremarkable. Old healed left rib cage 

fractures are seen.

 

Impression: Small right-sided pleural effusion with mild right lung base 

infiltrate or atelectasis.

 

Electronically signed by: Walt Messer MD (10/16/2018 5:07 PM) [facility]

## 2018-10-16 NOTE — PDOC
BRIEF OPERATIVE NOTE


Date:  Oct 16, 2018


Pre-Op Diagnosis


perirectal abscess


Post-Op Diagnosis


same


Procedure Performed


incision and drainage


Surgeon


oSto


Anesthesia Type:  General


Blood Loss


5cc


IV Fluid


550cc


Specimens Obtained


cultures


Findings


abscess


Complications


none


Operative Note


Wk #[id number]











BRANDEE CRUZ MD Oct 16, 2018 14:45

## 2018-10-17 VITALS — SYSTOLIC BLOOD PRESSURE: 146 MMHG | DIASTOLIC BLOOD PRESSURE: 92 MMHG

## 2018-10-17 VITALS — SYSTOLIC BLOOD PRESSURE: 132 MMHG | DIASTOLIC BLOOD PRESSURE: 78 MMHG

## 2018-10-17 VITALS — SYSTOLIC BLOOD PRESSURE: 158 MMHG | DIASTOLIC BLOOD PRESSURE: 90 MMHG

## 2018-10-17 VITALS — SYSTOLIC BLOOD PRESSURE: 144 MMHG | DIASTOLIC BLOOD PRESSURE: 87 MMHG

## 2018-10-17 VITALS — SYSTOLIC BLOOD PRESSURE: 140 MMHG | DIASTOLIC BLOOD PRESSURE: 88 MMHG

## 2018-10-17 VITALS — DIASTOLIC BLOOD PRESSURE: 82 MMHG | SYSTOLIC BLOOD PRESSURE: 131 MMHG

## 2018-10-17 LAB
ALBUMIN SERPL-MCNC: 2.6 G/DL (ref 3.4–5)
ALBUMIN/GLOB SERPL: 0.6 {RATIO} (ref 1–1.7)
ALP SERPL-CCNC: 146 U/L (ref 46–116)
ALT SERPL-CCNC: 112 U/L (ref 16–63)
ANION GAP SERPL CALC-SCNC: 14 MMOL/L (ref 6–14)
AST SERPL-CCNC: 32 U/L (ref 15–37)
BASOPHILS # BLD AUTO: 0.1 X10^3/UL (ref 0–0.2)
BASOPHILS NFR BLD: 0 % (ref 0–3)
BILIRUB SERPL-MCNC: 0.5 MG/DL (ref 0.2–1)
BUN SERPL-MCNC: 17 MG/DL (ref 8–26)
BUN/CREAT SERPL: 13 (ref 6–20)
CALCIUM SERPL-MCNC: 9.1 MG/DL (ref 8.5–10.1)
CHLORIDE SERPL-SCNC: 100 MMOL/L (ref 98–107)
CO2 SERPL-SCNC: 26 MMOL/L (ref 21–32)
CREAT SERPL-MCNC: 1.3 MG/DL (ref 0.7–1.3)
EOSINOPHIL NFR BLD: 0 % (ref 0–3)
EOSINOPHIL NFR BLD: 0 X10^3/UL (ref 0–0.7)
ERYTHROCYTE [DISTWIDTH] IN BLOOD BY AUTOMATED COUNT: 13.5 % (ref 11.5–14.5)
GFR SERPLBLD BASED ON 1.73 SQ M-ARVRAT: 60 ML/MIN
GLOBULIN SER-MCNC: 4.4 G/DL (ref 2.2–3.8)
GLUCOSE SERPL-MCNC: 378 MG/DL (ref 70–99)
HCT VFR BLD CALC: 33.9 % (ref 39–53)
HGB BLD-MCNC: 11.7 G/DL (ref 13–17.5)
LYMPHOCYTES # BLD: 1.5 X10^3/UL (ref 1–4.8)
LYMPHOCYTES NFR BLD AUTO: 9 % (ref 24–48)
MCH RBC QN AUTO: 30 PG (ref 25–35)
MCHC RBC AUTO-ENTMCNC: 34 G/DL (ref 31–37)
MCV RBC AUTO: 88 FL (ref 79–100)
MONO #: 1.1 X10^3/UL (ref 0–1.1)
MONOCYTES NFR BLD: 7 % (ref 0–9)
NEUT #: 13.3 X10^3UL (ref 1.8–7.7)
NEUTROPHILS NFR BLD AUTO: 83 % (ref 31–73)
PLATELET # BLD AUTO: 341 X10^3/UL (ref 140–400)
POTASSIUM SERPL-SCNC: 4.4 MMOL/L (ref 3.5–5.1)
PROT SERPL-MCNC: 7 G/DL (ref 6.4–8.2)
RBC # BLD AUTO: 3.85 X10^6/UL (ref 4.3–5.7)
SODIUM SERPL-SCNC: 140 MMOL/L (ref 136–145)
WBC # BLD AUTO: 16 X10^3/UL (ref 4–11)

## 2018-10-17 RX ADMIN — Medication SCH CAP: at 08:37

## 2018-10-17 RX ADMIN — INSULIN LISPRO SCH UNITS: 100 INJECTION, SOLUTION INTRAVENOUS; SUBCUTANEOUS at 18:17

## 2018-10-17 RX ADMIN — HYDROCODONE BITARTRATE AND ACETAMINOPHEN PRN TAB: 5; 325 TABLET ORAL at 17:30

## 2018-10-17 RX ADMIN — POLYETHYLENE GLYCOL 3350 SCH GM: 17 POWDER, FOR SOLUTION ORAL at 08:44

## 2018-10-17 RX ADMIN — INSULIN GLARGINE SCH UNITS: 100 INJECTION, SOLUTION SUBCUTANEOUS at 21:00

## 2018-10-17 RX ADMIN — DOCUSATE SODIUM SCH MG: 100 CAPSULE, LIQUID FILLED ORAL at 21:00

## 2018-10-17 RX ADMIN — LINEZOLID SCH MG: 600 TABLET, FILM COATED ORAL at 08:37

## 2018-10-17 RX ADMIN — HYDROCODONE BITARTRATE AND ACETAMINOPHEN PRN TAB: 5; 325 TABLET ORAL at 12:30

## 2018-10-17 RX ADMIN — PIPERACILLIN SODIUM AND TAZOBACTAM SODIUM SCH MLS/HR: 3; .375 INJECTION, POWDER, LYOPHILIZED, FOR SOLUTION INTRAVENOUS at 23:44

## 2018-10-17 RX ADMIN — INSULIN LISPRO SCH UNITS: 100 INJECTION, SOLUTION INTRAVENOUS; SUBCUTANEOUS at 08:43

## 2018-10-17 RX ADMIN — CELECOXIB SCH MG: 100 CAPSULE ORAL at 21:13

## 2018-10-17 RX ADMIN — INSULIN LISPRO SCH UNITS: 100 INJECTION, SOLUTION INTRAVENOUS; SUBCUTANEOUS at 08:44

## 2018-10-17 RX ADMIN — PIPERACILLIN SODIUM AND TAZOBACTAM SODIUM SCH MLS/HR: 3; .375 INJECTION, POWDER, LYOPHILIZED, FOR SOLUTION INTRAVENOUS at 12:31

## 2018-10-17 RX ADMIN — PIPERACILLIN SODIUM AND TAZOBACTAM SODIUM SCH MLS/HR: 3; .375 INJECTION, POWDER, LYOPHILIZED, FOR SOLUTION INTRAVENOUS at 17:29

## 2018-10-17 RX ADMIN — DOCUSATE SODIUM SCH MG: 100 CAPSULE, LIQUID FILLED ORAL at 08:37

## 2018-10-17 RX ADMIN — HYDROCODONE BITARTRATE AND ACETAMINOPHEN PRN TAB: 5; 325 TABLET ORAL at 23:47

## 2018-10-17 RX ADMIN — PIPERACILLIN SODIUM AND TAZOBACTAM SODIUM SCH MLS/HR: 3; .375 INJECTION, POWDER, LYOPHILIZED, FOR SOLUTION INTRAVENOUS at 05:28

## 2018-10-17 RX ADMIN — PIPERACILLIN SODIUM AND TAZOBACTAM SODIUM SCH MLS/HR: 3; .375 INJECTION, POWDER, LYOPHILIZED, FOR SOLUTION INTRAVENOUS at 00:25

## 2018-10-17 RX ADMIN — CELECOXIB SCH MG: 100 CAPSULE ORAL at 08:37

## 2018-10-17 RX ADMIN — INSULIN LISPRO SCH UNITS: 100 INJECTION, SOLUTION INTRAVENOUS; SUBCUTANEOUS at 12:37

## 2018-10-17 RX ADMIN — INSULIN LISPRO SCH UNITS: 100 INJECTION, SOLUTION INTRAVENOUS; SUBCUTANEOUS at 18:18

## 2018-10-17 RX ADMIN — Medication SCH CAP: at 21:13

## 2018-10-17 RX ADMIN — INSULIN LISPRO SCH UNITS: 100 INJECTION, SOLUTION INTRAVENOUS; SUBCUTANEOUS at 12:38

## 2018-10-17 RX ADMIN — LINEZOLID SCH MG: 600 TABLET, FILM COATED ORAL at 21:13

## 2018-10-17 NOTE — PDOC
PROGRESS NOTES


Chief Complaint


Chief Complaint


 


Left perineal cellulitis- 


Sepsis  


Diabetes 1, insulin req.  with unknown hemoglobin A1c -  


rectal pain, cannot void








History of Present Illness


History of Present Illness


Pt seen and examined


VSS


CO pain


Reviewed notes/labs





Vitals


Vitals





Vital Signs








  Date Time  Temp Pulse Resp B/P (MAP) Pulse Ox O2 Delivery O2 Flow Rate FiO2


 


10/17/18 08:30      Room Air  


 


10/17/18 07:00 97.9 90 18 140/88 (105) 91   





 97.9       


 


10/16/18 17:04       4.0 











Physical Exam


Physical Exam


CONSTITUTIONAL:  He is a very pleasant gentleman.  He is cooperative.  He is in


no acute distress.


HEENT:  Pupils equal and reactive with normal conjunctivae.  Oral cavity,


pharynx is clear.


NECK:  Supple with good range of motion.


LUNGS:  Clear to auscultation bilaterally.


HEART:  S1, S2.


ABDOMEN:  Soft, nontender, nondistended with positive bowel sounds.


EXTREMITIES:  Without clubbing, cyanosis or gross edema.


SKIN:  Warm to touch without signs of generalized rash.  In the left medial


buttock area, there is some induration and tenderness still.  Drain in place on 

the left


perirectal area.  There is no gross drainage.  There is no skin breakdown.


NEUROLOGIC:  He is nonfocal and appropriate.


PSYCHIATRIC:  Affect is pleasant.


General:  Alert, Oriented X3, Cooperative, No acute distress


Heart:  Regular rate


Abdomen:  Soft


Extremities:  No clubbing, No cyanosis, No edema, Normal pulses, No tenderness/

swelling


Skin:  Other (perirectal area with induration and erythema )





Labs


LABS





Laboratory Tests








Test


 10/16/18


11:44 10/16/18


15:01 10/16/18


16:34 10/16/18


21:23


 


Glucose (Fingerstick)


 241 mg/dL


(70-99) 224 mg/dL


(70-99) 268 mg/dL


(70-99) 260 mg/dL


(70-99)


 


Test


 10/17/18


06:25 10/17/18


08:09 10/17/18


08:10 





 


Glucose (Fingerstick)


 336 mg/dL


(70-99) 393 mg/dL


(70-99) 


 





 


White Blood Count


 


 


 16.0 x10^3/uL


(4.0-11.0) 





 


Red Blood Count


 


 


 3.85 x10^6/uL


(4.30-5.70) 





 


Hemoglobin


 


 


 11.7 g/dL


(13.0-17.5) 





 


Hematocrit


 


 


 33.9 %


(39.0-53.0) 





 


Mean Corpuscular Volume   88 fL ()  


 


Mean Corpuscular Hemoglobin   30 pg (25-35)  


 


Mean Corpuscular Hemoglobin


Concent 


 


 34 g/dL


(31-37) 





 


Red Cell Distribution Width


 


 


 13.5 %


(11.5-14.5) 





 


Platelet Count


 


 


 341 x10^3/uL


(140-400) 





 


Neutrophils (%) (Auto)   83 % (31-73)  


 


Lymphocytes (%) (Auto)   9 % (24-48)  


 


Monocytes (%) (Auto)   7 % (0-9)  


 


Eosinophils (%) (Auto)   0 % (0-3)  


 


Basophils (%) (Auto)   0 % (0-3)  


 


Neutrophils # (Auto)


 


 


 13.3 x10^3uL


(1.8-7.7) 





 


Lymphocytes # (Auto)


 


 


 1.5 x10^3/uL


(1.0-4.8) 





 


Monocytes # (Auto)


 


 


 1.1 x10^3/uL


(0.0-1.1) 





 


Eosinophils # (Auto)


 


 


 0.0 x10^3/uL


(0.0-0.7) 





 


Basophils # (Auto)


 


 


 0.1 x10^3/uL


(0.0-0.2) 





 


Sodium Level


 


 


 140 mmol/L


(136-145) 





 


Potassium Level


 


 


 4.4 mmol/L


(3.5-5.1) 





 


Chloride Level


 


 


 100 mmol/L


() 





 


Carbon Dioxide Level


 


 


 26 mmol/L


(21-32) 





 


Anion Gap   14 (6-14)  


 


Blood Urea Nitrogen


 


 


 17 mg/dL


(8-26) 





 


Creatinine


 


 


 1.3 mg/dL


(0.7-1.3) 





 


Estimated GFR


(Cockcroft-Gault) 


 


 60.0 


 





 


BUN/Creatinine Ratio   13 (6-20)  


 


Glucose Level


 


 


 378 mg/dL


(70-99) 





 


Calcium Level


 


 


 9.1 mg/dL


(8.5-10.1) 





 


Total Bilirubin


 


 


 0.5 mg/dL


(0.2-1.0) 





 


Aspartate Amino Transf


(AST/SGOT) 


 


 32 U/L (15-37) 


 





 


Alanine Aminotransferase


(ALT/SGPT) 


 


 112 U/L


(16-63) 





 


Alkaline Phosphatase


 


 


 146 U/L


() 





 


Total Protein


 


 


 7.0 g/dL


(6.4-8.2) 





 


Albumin


 


 


 2.6 g/dL


(3.4-5.0) 





 


Albumin/Globulin Ratio   0.6 (1.0-1.7)  











Review of Systems


Review of Systems


co pain


co hunger





Assessment and Plan


Assessmemt and Plan


Problems


Medical Problems:


(1) Cellulitis


Status: Acute


POD #1 I and D


Left perineal cellulitis- 


Sepsis  


Diabetes 1, insulin req.  with unknown hemoglobin A1c -  


rectal pain, cannot void





Plan


IV syn


[facility]


Labs


Home meds


PTOT








Comment


Review of Relevant


I have reviewed the following items itz (where applicable) has been applied.


Labs





Laboratory Tests








Test


 10/15/18


11:17 10/15/18


16:51 10/15/18


20:46 10/16/18


04:20


 


Glucose (Fingerstick)


 211 mg/dL


(70-99) 86 mg/dL


(70-99) 76 mg/dL


(70-99) 





 


White Blood Count


 


 


 


 17.5 x10^3/uL


(4.0-11.0)


 


Red Blood Count


 


 


 


 3.86 x10^6/uL


(4.30-5.70)


 


Hemoglobin


 


 


 


 11.5 g/dL


(13.0-17.5)


 


Hematocrit


 


 


 


 34.3 %


(39.0-53.0)


 


Mean Corpuscular Volume    89 fL () 


 


Mean Corpuscular Hemoglobin    30 pg (25-35) 


 


Mean Corpuscular Hemoglobin


Concent 


 


 


 34 g/dL


(31-37)


 


Red Cell Distribution Width


 


 


 


 13.4 %


(11.5-14.5)


 


Platelet Count


 


 


 


 262 x10^3/uL


(140-400)


 


Neutrophils (%) (Auto)    73 % (31-73) 


 


Lymphocytes (%) (Auto)    13 % (24-48) 


 


Monocytes (%) (Auto)    11 % (0-9) 


 


Eosinophils (%) (Auto)    3 % (0-3) 


 


Basophils (%) (Auto)    0 % (0-3) 


 


Neutrophils # (Auto)


 


 


 


 12.9 x10^3uL


(1.8-7.7)


 


Lymphocytes # (Auto)


 


 


 


 2.2 x10^3/uL


(1.0-4.8)


 


Monocytes # (Auto)


 


 


 


 1.9 x10^3/uL


(0.0-1.1)


 


Eosinophils # (Auto)


 


 


 


 0.5 x10^3/uL


(0.0-0.7)


 


Basophils # (Auto)


 


 


 


 0.1 x10^3/uL


(0.0-0.2)


 


Sodium Level


 


 


 


 139 mmol/L


(136-145)


 


Potassium Level


 


 


 


 4.8 mmol/L


(3.5-5.1)


 


Chloride Level


 


 


 


 102 mmol/L


()


 


Carbon Dioxide Level


 


 


 


 25 mmol/L


(21-32)


 


Anion Gap    12 (6-14) 


 


Blood Urea Nitrogen


 


 


 


 16 mg/dL


(8-26)


 


Creatinine


 


 


 


 1.2 mg/dL


(0.7-1.3)


 


Estimated GFR


(Cockcroft-Gault) 


 


 


 65.8 





 


BUN/Creatinine Ratio    13 (6-20) 


 


Glucose Level


 


 


 


 221 mg/dL


(70-99)


 


Calcium Level


 


 


 


 8.6 mg/dL


(8.5-10.1)


 


Total Bilirubin


 


 


 


 0.9 mg/dL


(0.2-1.0)


 


Aspartate Amino Transf


(AST/SGOT) 


 


 


 232 U/L


(15-37)


 


Alanine Aminotransferase


(ALT/SGPT) 


 


 


 188 U/L


(16-63)


 


Alkaline Phosphatase


 


 


 


 165 U/L


()


 


Total Protein


 


 


 


 6.5 g/dL


(6.4-8.2)


 


Albumin


 


 


 


 2.4 g/dL


(3.4-5.0)


 


Albumin/Globulin Ratio    0.6 (1.0-1.7) 


 


Test


 10/16/18


06:57 10/16/18


11:44 10/16/18


15:01 10/16/18


16:34


 


Glucose (Fingerstick)


 273 mg/dL


(70-99) 241 mg/dL


(70-99) 224 mg/dL


(70-99) 268 mg/dL


(70-99)


 


Test


 10/16/18


21:23 10/17/18


06:25 10/17/18


08:09 10/17/18


08:10


 


Glucose (Fingerstick)


 260 mg/dL


(70-99) 336 mg/dL


(70-99) 393 mg/dL


(70-99) 





 


White Blood Count


 


 


 


 16.0 x10^3/uL


(4.0-11.0)


 


Red Blood Count


 


 


 


 3.85 x10^6/uL


(4.30-5.70)


 


Hemoglobin


 


 


 


 11.7 g/dL


(13.0-17.5)


 


Hematocrit


 


 


 


 33.9 %


(39.0-53.0)


 


Mean Corpuscular Volume    88 fL () 


 


Mean Corpuscular Hemoglobin    30 pg (25-35) 


 


Mean Corpuscular Hemoglobin


Concent 


 


 


 34 g/dL


(31-37)


 


Red Cell Distribution Width


 


 


 


 13.5 %


(11.5-14.5)


 


Platelet Count


 


 


 


 341 x10^3/uL


(140-400)


 


Neutrophils (%) (Auto)    83 % (31-73) 


 


Lymphocytes (%) (Auto)    9 % (24-48) 


 


Monocytes (%) (Auto)    7 % (0-9) 


 


Eosinophils (%) (Auto)    0 % (0-3) 


 


Basophils (%) (Auto)    0 % (0-3) 


 


Neutrophils # (Auto)


 


 


 


 13.3 x10^3uL


(1.8-7.7)


 


Lymphocytes # (Auto)


 


 


 


 1.5 x10^3/uL


(1.0-4.8)


 


Monocytes # (Auto)


 


 


 


 1.1 x10^3/uL


(0.0-1.1)


 


Eosinophils # (Auto)


 


 


 


 0.0 x10^3/uL


(0.0-0.7)


 


Basophils # (Auto)


 


 


 


 0.1 x10^3/uL


(0.0-0.2)


 


Sodium Level


 


 


 


 140 mmol/L


(136-145)


 


Potassium Level


 


 


 


 4.4 mmol/L


(3.5-5.1)


 


Chloride Level


 


 


 


 100 mmol/L


()


 


Carbon Dioxide Level


 


 


 


 26 mmol/L


(21-32)


 


Anion Gap    14 (6-14) 


 


Blood Urea Nitrogen


 


 


 


 17 mg/dL


(8-26)


 


Creatinine


 


 


 


 1.3 mg/dL


(0.7-1.3)


 


Estimated GFR


(Cockcroft-Gault) 


 


 


 60.0 





 


BUN/Creatinine Ratio    13 (6-20) 


 


Glucose Level


 


 


 


 378 mg/dL


(70-99)


 


Calcium Level


 


 


 


 9.1 mg/dL


(8.5-10.1)


 


Total Bilirubin


 


 


 


 0.5 mg/dL


(0.2-1.0)


 


Aspartate Amino Transf


(AST/SGOT) 


 


 


 32 U/L (15-37) 





 


Alanine Aminotransferase


(ALT/SGPT) 


 


 


 112 U/L


(16-63)


 


Alkaline Phosphatase


 


 


 


 146 U/L


()


 


Total Protein


 


 


 


 7.0 g/dL


(6.4-8.2)


 


Albumin


 


 


 


 2.6 g/dL


(3.4-5.0)


 


Albumin/Globulin Ratio    0.6 (1.0-1.7) 








Laboratory Tests








Test


 10/16/18


11:44 10/16/18


15:01 10/16/18


16:34 10/16/18


21:23


 


Glucose (Fingerstick)


 241 mg/dL


(70-99) 224 mg/dL


(70-99) 268 mg/dL


(70-99) 260 mg/dL


(70-99)


 


Test


 10/17/18


06:25 10/17/18


08:09 10/17/18


08:10 





 


Glucose (Fingerstick)


 336 mg/dL


(70-99) 393 mg/dL


(70-99) 


 





 


White Blood Count


 


 


 16.0 x10^3/uL


(4.0-11.0) 





 


Red Blood Count


 


 


 3.85 x10^6/uL


(4.30-5.70) 





 


Hemoglobin


 


 


 11.7 g/dL


(13.0-17.5) 





 


Hematocrit


 


 


 33.9 %


(39.0-53.0) 





 


Mean Corpuscular Volume   88 fL ()  


 


Mean Corpuscular Hemoglobin   30 pg (25-35)  


 


Mean Corpuscular Hemoglobin


Concent 


 


 34 g/dL


(31-37) 





 


Red Cell Distribution Width


 


 


 13.5 %


(11.5-14.5) 





 


Platelet Count


 


 


 341 x10^3/uL


(140-400) 





 


Neutrophils (%) (Auto)   83 % (31-73)  


 


Lymphocytes (%) (Auto)   9 % (24-48)  


 


Monocytes (%) (Auto)   7 % (0-9)  


 


Eosinophils (%) (Auto)   0 % (0-3)  


 


Basophils (%) (Auto)   0 % (0-3)  


 


Neutrophils # (Auto)


 


 


 13.3 x10^3uL


(1.8-7.7) 





 


Lymphocytes # (Auto)


 


 


 1.5 x10^3/uL


(1.0-4.8) 





 


Monocytes # (Auto)


 


 


 1.1 x10^3/uL


(0.0-1.1) 





 


Eosinophils # (Auto)


 


 


 0.0 x10^3/uL


(0.0-0.7) 





 


Basophils # (Auto)


 


 


 0.1 x10^3/uL


(0.0-0.2) 





 


Sodium Level


 


 


 140 mmol/L


(136-145) 





 


Potassium Level


 


 


 4.4 mmol/L


(3.5-5.1) 





 


Chloride Level


 


 


 100 mmol/L


() 





 


Carbon Dioxide Level


 


 


 26 mmol/L


(21-32) 





 


Anion Gap   14 (6-14)  


 


Blood Urea Nitrogen


 


 


 17 mg/dL


(8-26) 





 


Creatinine


 


 


 1.3 mg/dL


(0.7-1.3) 





 


Estimated GFR


(Cockcroft-Gault) 


 


 60.0 


 





 


BUN/Creatinine Ratio   13 (6-20)  


 


Glucose Level


 


 


 378 mg/dL


(70-99) 





 


Calcium Level


 


 


 9.1 mg/dL


(8.5-10.1) 





 


Total Bilirubin


 


 


 0.5 mg/dL


(0.2-1.0) 





 


Aspartate Amino Transf


(AST/SGOT) 


 


 32 U/L (15-37) 


 





 


Alanine Aminotransferase


(ALT/SGPT) 


 


 112 U/L


(16-63) 





 


Alkaline Phosphatase


 


 


 146 U/L


() 





 


Total Protein


 


 


 7.0 g/dL


(6.4-8.2) 





 


Albumin


 


 


 2.6 g/dL


(3.4-5.0) 





 


Albumin/Globulin Ratio   0.6 (1.0-1.7)  








Microbiology


10/14/18 Blood Culture - Preliminary, Resulted


           NO GROWTH AFTER 3 DAYS


Medications





Current Medications


Fentanyl Citrate (Fentanyl 2ml Vial) 25 mcg PRN Q15MIN  PRN IV PAIN GREATER 

THAN 3/10 Last administered on 10/14/18at 14:14;  Start 10/14/18 at 10:30;  

Stop 10/15/18 at 10:29;  Status DC


Sodium Chloride 1,000 ml @  1,000 mls/hr Q1H IV  Last administered on 10/14/

18at 10:39;  Start 10/14/18 at 10:22;  Stop 10/14/18 at 11:21;  Status DC


Ondansetron HCl (Zofran) 4 mg 1X  ONCE IV  Last administered on 10/14/18at 10:45

;  Start 10/14/18 at 11:00;  Stop 10/14/18 at 11:01;  Status DC


Vancomycin HCl 250 ml @  250 mls/hr 1X  ONCE IV ;  Start 10/14/18 at 10:30;  

Stop 10/14/18 at 10:31;  Status DC


Piperacillin Sod/ Tazobactam Sod 3.375 gm/Sodium Chloride 50 ml @  100 mls/hr 

1X  ONCE IV  Last administered on 10/14/18at 11:04;  Start 10/14/18 at 11:00;  

Stop 10/14/18 at 11:29;  Status DC


Vancomycin HCl 1.75 gm/Sodium Chloride 500 ml @  250 mls/hr 1X  ONCE IV  Last 

administered on 10/14/18at 11:05;  Start 10/14/18 at 11:30;  Stop 10/14/18 at 13

:29;  Status DC


Iohexol (Omnipaque 300 Mg/ml) 75 ml 1X  ONCE IV ;  Start 10/14/18 at 11:45;  

Stop 10/14/18 at 11:48;  Status DC


Info (CONTRAST GIVEN -- Rx MONITORING) 1 each PRN DAILY  PRN MC SEE COMMENTS;  

Start 10/14/18 at 12:00;  Stop 10/16/18 at 11:59;  Status DC


Ondansetron HCl (Zofran) 4 mg PRN Q8HRS  PRN IV NAUSEA/VOMITING;  Start 10/14/

18 at 12:45;  Stop 10/14/18 at 12:55;  Status DC


Morphine Sulfate (Morphine Sulfate) 4 mg PRN Q2HR  PRN IV PAIN Last 

administered on 10/14/18at 17:32;  Start 10/14/18 at 12:45;  Stop 10/15/18 at 12

:44;  Status DC


Sodium Chloride 1,000 ml @  75 mls/hr [id number] IV  Last administered on 10/15/

18at 05:52;  Start 10/14/18 at 12:32;  Stop 10/15/18 at 12:31;  Status DC


Ondansetron HCl (Zofran) 4 mg PRN Q6HRS  PRN IV NAUSEA/VOMITING Last 

administered on 10/16/18at 07:08;  Start 10/14/18 at 13:00


Celecoxib (CeleBREX) 100 mg BID PO  Last administered on 10/17/18at 08:37;  

Start 10/14/18 at 21:00


Acetaminophen/ Hydrocodone Bitart (Lortab 5/325) 1 tab PRN Q4HRS  PRN PO PAIN 

Last administered on 10/16/18at 21:45;  Start 10/14/18 at 13:00


Diphenhydramine HCl (Benadryl) 25 mg PRN QHS  PRN PO INSOMNIA Last administered 

on 10/15/18at 21:54;  Start 10/14/18 at 13:00


Insulin Human Lispro (HumaLOG) 0-9 UNITS TIDWMEALS SQ  Last administered on 10/

17/18at 08:43;  Start 10/14/18 at 17:00


Dextrose (Dextrose 50%-Water Syringe) 12.5 gm PRN Q15MIN  PRN IV SEE COMMENTS;  

Start 10/14/18 at 13:00


Insulin Glargine (Lantus) 18 units QHS SQ  Last administered on 10/14/18at 20:57

;  Start 10/14/18 at 21:00;  Stop 10/15/18 at 10:13;  Status DC


Insulin Human Lispro (HumaLOG) 15 units TIDWMEALS SQ  Last administered on 10/15

/18at 09:19;  Start 10/14/18 at 17:00;  Stop 10/15/18 at 10:13;  Status DC


Acetaminophen/ Hydrocodone Bitart (Lortab 5/325) 1 tab PRN Q6HRS  PRN PO PAIN;  

Start 10/14/18 at 13:00;  Status UNV


Clindamycin Phosphate 50 ml @  100 mls/hr Q8HRS IV  Last administered on 10/15/

18at 05:52;  Start 10/14/18 at 22:00;  Stop 10/15/18 at 11:28;  Status DC


Acetaminophen (Tylenol) 650 mg PRN Q6HRS  PRN PO FEVER Last administered on 10/

15/18at 17:57;  Start 10/14/18 at 21:15


Docusate Sodium (Colace) 100 mg BID PO  Last administered on 10/17/18at 08:37;  

Start 10/14/18 at 22:00


Polyethylene Glycol (miraLAX PACKET) 17 gm DAILY PO  Last administered on 10/15/

18at 09:09;  Start 10/14/18 at 22:00


Magnesium Hydroxide (Milk Of Magnesia) 2,400 mg PRN DAILY  PRN PO CONSTIPATION 

1ST CHOICE;  Start 10/14/18 at 21:15


Influenza Virus Vaccine (Afluria Trivalent [id number] Syringe) 0.5 ml ONCE ONCE 

VAX IM ;  Start 10/15/18 at 09:00;  Stop 10/15/18 at 09:01;  Status DC


Insulin Glargine (Lantus) 22 units QHS SQ ;  Start 10/15/18 at 21:00;  Stop 10/

15/18 at 21:00;  Status DC


Insulin Human Lispro (HumaLOG) 20 units TIDWMEALS SQ  Last administered on 10/17

/18at 08:44;  Start 10/15/18 at 12:00;  Stop 10/17/18 at 09:39;  Status DC


Insulin Glargine (Lantus) 18 units QHS SQ  Last administered on 10/16/18at 21:50

;  Start 10/15/18 at 21:00


Piperacillin Sod/ Tazobactam Sod 3.375 gm/Sodium Chloride 50 ml @  100 mls/hr 

Q6HRS IV  Last administered on 10/17/18at 05:28;  Start 10/15/18 at 12:00


Linezolid (Zyvox) 600 mg BID PO  Last administered on 10/17/18at 08:37;  Start 

10/15/18 at 12:00


Lactobacillus Rhamnosus (Culturelle) 1 cap BID PO  Last administered on 10/17/

18at 08:37;  Start 10/15/18 at 12:00


Ondansetron HCl (Zofran) 4 mg PRN Q6HRS  PRN IV NAUSEA/VOMITING;  Start 10/16/

18 at 07:00;  Stop 10/17/18 at 06:59;  Status DC


Fentanyl Citrate (Fentanyl 2ml Vial) 25 mcg PRN Q5MIN  PRN IV MILD PAIN;  Start 

10/16/18 at 07:00;  Stop 10/17/18 at 06:59;  Status DC


Fentanyl Citrate (Fentanyl 2ml Vial) 50 mcg PRN Q5MIN  PRN IV MODERATE TO 

SEVERE PAIN Last administered on 10/16/18at 16:03;  Start 10/16/18 at 07:00;  

Stop 10/17/18 at 06:59;  Status DC


Morphine Sulfate (Morphine Sulfate) 1 mg PRN Q10MIN  PRN IV SEVERE PAIN;  Start 

10/16/18 at 07:00;  Stop 10/17/18 at 06:59;  Status DC


Ringer's Solution 1,000 ml @  30 mls/hr Q24H IV  Last administered on 10/16/

18at 12:18;  Start 10/16/18 at 07:00;  Stop 10/16/18 at 18:59;  Status DC


Lidocaine HCl (Xylocaine-Mpf 1% 2ml Vial) 2 ml PRN 1X  PRN ID PRIOR TO IV START

;  Start 10/16/18 at 07:00;  Stop 10/17/18 at 06:59;  Status DC


Hydromorphone HCl (Dilaudid) 0.5 mg PRN Q10MIN  PRN IV SEV PAIN, Second choice;

  Start 10/16/18 at 07:00;  Stop 10/17/18 at 06:59;  Status DC


Prochlorperazine Edisylate (Compazine) 5 mg PACU PRN  PRN IV NAUSEA, MRX1;  

Start 10/16/18 at 07:00;  Stop 10/17/18 at 06:59;  Status DC


Morphine Sulfate (Morphine Sulfate) 2 mg PRN Q2HR  PRN IV PAIN SEVERE Last 

administered on 10/16/18at 21:13;  Start 10/16/18 at 10:45


Propofol 20 ml @ As Directed STK-MED ONCE IV ;  Start 10/16/18 at 11:55;  Stop 

10/16/18 at 11:56;  Status DC


Dexamethasone Sodium Phosphate (Decadron) 20 mg STK-MED ONCE .ROUTE ;  Start 10/

16/18 at 11:55;  Stop 10/16/18 at 11:56;  Status DC


Lidocaine HCl (Lidocaine Pf 2% Vial) 5 ml STK-MED ONCE .ROUTE ;  Start 10/16/18 

at 11:55;  Stop 10/16/18 at 11:56;  Status DC


Ondansetron HCl (Zofran) 4 mg STK-MED ONCE .ROUTE ;  Start 10/16/18 at 11:55;  

Stop 10/16/18 at 11:56;  Status DC


Fentanyl Citrate (Fentanyl 2ml Vial) 100 mcg STK-MED ONCE .ROUTE ;  Start 10/16/

18 at 11:55;  Stop 10/16/18 at 11:56;  Status DC


Midazolam HCl (Versed) 2 mg STK-MED ONCE .ROUTE ;  Start 10/16/18 at 12:00;  

Stop 10/16/18 at 12:01;  Status DC


Bupivacaine HCl/ Epinephrine Bitart (Sensorcain-Mpf Epi 0.5%-1:[id number]) 30 ml STK

-MED ONCE .ROUTE  Last administered on 10/16/18at 13:49;  Start 10/16/18 at 12:

26;  Stop 10/16/18 at 13:27;  Status DC


Esmolol HCl (Brevibloc) 100 mg STK-MED ONCE IV ;  Start 10/16/18 at 13:41;  

Stop 10/16/18 at 13:42;  Status DC


Cellulose (Surgicel Hemostat 2x3) 1 each STK-MED ONCE .ROUTE  Last administered 

on 10/16/18at 14:03;  Start 10/16/18 at 12:48;  Stop 10/16/18 at 13:48;  Status 

DC


Sevoflurane (Ultane) 30 ml STK-MED ONCE IH ;  Start 10/16/18 at 13:51;  Stop 10/

16/18 at 13:52;  Status DC


Epinephrine HCl (Adrenalin) 30 mg STK-MED ONCE .ROUTE  Last administered on 10/

16/18at 14:04;  Start 10/16/18 at 12:57;  Stop 10/16/18 at 13:57;  Status DC


Epinephrine HCl (EPINEPHrine SYRINGE) 1 mg STK-MED ONCE .ROUTE ;  Start 10/16/

18 at 13:59;  Stop 10/16/18 at 14:00;  Status DC


Albuterol Sulfate (Ventolin Neb Soln) 2.5 mg 1X  ONCE NEB  Last administered on 

10/16/18at 14:32;  Start 10/16/18 at 14:30;  Stop 10/16/18 at 14:31;  Status DC


Furosemide (Lasix) 10 mg 1X  ONCE IVP  Last administered on 10/16/18at 14:43;  

Start 10/16/18 at 14:45;  Stop 10/16/18 at 14:46;  Status DC


Furosemide (Lasix) 10 mg 1X  ONCE IVP ;  Start 10/16/18 at 15:30;  Stop 10/16/

18 at 15:33;  Status DC


Insulin Human Lispro (HumaLOG VIAL) 3 unit 1X  ONCE SQ  Last administered on 10/

16/18at 15:34;  Start 10/16/18 at 15:30;  Stop 10/16/18 at 15:33;  Status DC


Insulin Human Lispro (HumaLOG) 25 units TIDWMEALS SQ ;  Start 10/17/18 at 12:00





Active Scripts


Active


Norco 5-325 Tablet (Acetaminophen/Hydrocodone Bitart) 1 Each Tablet 1 Tab PO 

PRN Q6HRS PRN


Bactrim Ds Tablet (Sulfamethoxazole/Trimethoprim) 1 Each Tablet 1 Tab PO BID


Reported


Humalog (Insulin Lispro) 100 Unit/1 Ml Cartridge 100 Unit SQ 


Lantus Solostar (Insulin Glargine,Hum.rec.anlog) 100 Unit/1 Ml Insuln.pen 10 

Unit SQ QHS


Vitals/I & O





Vital Sign - Last 24 Hours








 10/16/18 10/16/18 10/16/18 10/16/18





 11:33 14:12 14:12 14:27


 


Temp  98.0  





  98.0  


 


Pulse  103  100


 


Resp  28  20


 


B/P (MAP)  165/91  156/91


 


Pulse Ox  97  95


 


O2 Delivery Room Air Simple Mask Mask Simple Mask


 


O2 Flow Rate  10 10 10


 


    





    





 10/16/18 10/16/18 10/16/18 10/16/18





 14:35 14:51 15:06 15:14


 


Pulse  101 98 


 


Resp  32 20 22


 


B/P (MAP)  167/84 160/82 


 


Pulse Ox 92 97 90 99


 


O2 Delivery Nasal Cannula Nasal Cannula Nasal Cannula Nasal Cannula


 


O2 Flow Rate 4.0 4 4 





 10/16/18 10/16/18 10/16/18 10/16/18





 15:21 15:36 15:51 16:03


 


Pulse 100 101 98 


 


Resp 20 20 20 22


 


B/P (MAP) 160/82 163/83 162/80 


 


Pulse Ox 91 92 92 


 


O2 Delivery Nasal Cannula Nasal Cannula Nasal Cannula 


 


O2 Flow Rate 4 4 4 





 10/16/18 10/16/18 10/16/18 10/16/18





 16:06 16:10 17:04 19:00


 


Temp 97.6   98.5





 97.6   98.5


 


Pulse 97   97


 


Resp 24   18


 


B/P (MAP) 158/76   148/85 (106)


 


Pulse Ox 95   91


 


O2 Delivery Nasal Cannula Nasal Cannula Nasal Cannula 


 


O2 Flow Rate 4 4 4.0 


 


    





    





 10/16/18 10/16/18 10/16/18 10/16/18





 20:00 21:13 21:43 21:45


 


Resp  18 18 18


 


Pulse Ox  95 95 95


 


O2 Delivery Room Air Room Air Room Air Room Air





 10/16/18 10/16/18 10/17/18 10/17/18





 22:45 23:00 03:00 07:00


 


Temp  98.0 97.6 97.9





  98.0 97.6 97.9


 


Pulse  96 80 90


 


Resp 18 18 18 18


 


B/P (MAP)  136/79 (98) 158/90 (112) 140/88 (105)


 


Pulse Ox 94 91 91 91


 


O2 Delivery Room Air Room Air Room Air Room Air


 


    





    





 10/17/18   





 08:30   


 


O2 Delivery Room Air   














Intake and Output   


 


 10/16/18 10/16/18 10/17/18





 15:01 23:01 07:01


 


Intake Total 850 ml 650 ml 1140 ml


 


Output Total  1040 ml 


 


Balance 850 ml -390 ml 1140 ml

















ELENA PRETTY III DO Oct 17, 2018 11:18

## 2018-10-17 NOTE — PDOC
SURGICAL PROGRESS NOTE


Subjective


pain improved


asking about how this happened


explained how a fistula occurs


sister at bedside


Vital Signs





Vital Signs








  Date Time  Temp Pulse Resp B/P (MAP) Pulse Ox O2 Delivery O2 Flow Rate FiO2


 


10/17/18 13:40      Room Air  


 


10/17/18 11:00 97.8 81 18 132/78 (96) 93   





 97.8       


 


10/16/18 17:04       4.0 








I&O











Intake and Output 


 


 10/17/18





 07:00


 


Intake Total 2640 ml


 


Output Total 1040 ml


 


Balance 1600 ml


 


 


 


Intake Oral 1790 ml


 


IV Total 850 ml


 


Output Urine Total 1040 ml


 


# Voids 4








PATIENT HAS A BILL:  No


Skin:  Other (perirectal/perineal area less erythematous, less indurated)


Labs





Laboratory Tests








Test


 10/15/18


16:51 10/15/18


20:46 10/16/18


04:20 10/16/18


06:57


 


Glucose (Fingerstick)


 86 mg/dL


(70-99) 76 mg/dL


(70-99) 


 273 mg/dL


(70-99)


 


White Blood Count


 


 


 17.5 x10^3/uL


(4.0-11.0) 





 


Red Blood Count


 


 


 3.86 x10^6/uL


(4.30-5.70) 





 


Hemoglobin


 


 


 11.5 g/dL


(13.0-17.5) 





 


Hematocrit


 


 


 34.3 %


(39.0-53.0) 





 


Mean Corpuscular Volume   89 fL ()  


 


Mean Corpuscular Hemoglobin   30 pg (25-35)  


 


Mean Corpuscular Hemoglobin


Concent 


 


 34 g/dL


(31-37) 





 


Red Cell Distribution Width


 


 


 13.4 %


(11.5-14.5) 





 


Platelet Count


 


 


 262 x10^3/uL


(140-400) 





 


Neutrophils (%) (Auto)   73 % (31-73)  


 


Lymphocytes (%) (Auto)   13 % (24-48)  


 


Monocytes (%) (Auto)   11 % (0-9)  


 


Eosinophils (%) (Auto)   3 % (0-3)  


 


Basophils (%) (Auto)   0 % (0-3)  


 


Neutrophils # (Auto)


 


 


 12.9 x10^3uL


(1.8-7.7) 





 


Lymphocytes # (Auto)


 


 


 2.2 x10^3/uL


(1.0-4.8) 





 


Monocytes # (Auto)


 


 


 1.9 x10^3/uL


(0.0-1.1) 





 


Eosinophils # (Auto)


 


 


 0.5 x10^3/uL


(0.0-0.7) 





 


Basophils # (Auto)


 


 


 0.1 x10^3/uL


(0.0-0.2) 





 


Sodium Level


 


 


 139 mmol/L


(136-145) 





 


Potassium Level


 


 


 4.8 mmol/L


(3.5-5.1) 





 


Chloride Level


 


 


 102 mmol/L


() 





 


Carbon Dioxide Level


 


 


 25 mmol/L


(21-32) 





 


Anion Gap   12 (6-14)  


 


Blood Urea Nitrogen


 


 


 16 mg/dL


(8-26) 





 


Creatinine


 


 


 1.2 mg/dL


(0.7-1.3) 





 


Estimated GFR


(Cockcroft-Gault) 


 


 65.8 


 





 


BUN/Creatinine Ratio   13 (6-20)  


 


Glucose Level


 


 


 221 mg/dL


(70-99) 





 


Calcium Level


 


 


 8.6 mg/dL


(8.5-10.1) 





 


Total Bilirubin


 


 


 0.9 mg/dL


(0.2-1.0) 





 


Aspartate Amino Transf


(AST/SGOT) 


 


 232 U/L


(15-37) 





 


Alanine Aminotransferase


(ALT/SGPT) 


 


 188 U/L


(16-63) 





 


Alkaline Phosphatase


 


 


 165 U/L


() 





 


Total Protein


 


 


 6.5 g/dL


(6.4-8.2) 





 


Albumin


 


 


 2.4 g/dL


(3.4-5.0) 





 


Albumin/Globulin Ratio   0.6 (1.0-1.7)  


 


Test


 10/16/18


11:44 10/16/18


15:01 10/16/18


16:34 10/16/18


21:23


 


Glucose (Fingerstick)


 241 mg/dL


(70-99) 224 mg/dL


(70-99) 268 mg/dL


(70-99) 260 mg/dL


(70-99)


 


Test


 10/17/18


06:25 10/17/18


08:09 10/17/18


08:10 10/17/18


11:45


 


Glucose (Fingerstick)


 336 mg/dL


(70-99) 393 mg/dL


(70-99) 


 288 mg/dL


(70-99)


 


White Blood Count


 


 


 16.0 x10^3/uL


(4.0-11.0) 





 


Red Blood Count


 


 


 3.85 x10^6/uL


(4.30-5.70) 





 


Hemoglobin


 


 


 11.7 g/dL


(13.0-17.5) 





 


Hematocrit


 


 


 33.9 %


(39.0-53.0) 





 


Mean Corpuscular Volume   88 fL ()  


 


Mean Corpuscular Hemoglobin   30 pg (25-35)  


 


Mean Corpuscular Hemoglobin


Concent 


 


 34 g/dL


(31-37) 





 


Red Cell Distribution Width


 


 


 13.5 %


(11.5-14.5) 





 


Platelet Count


 


 


 341 x10^3/uL


(140-400) 





 


Neutrophils (%) (Auto)   83 % (31-73)  


 


Lymphocytes (%) (Auto)   9 % (24-48)  


 


Monocytes (%) (Auto)   7 % (0-9)  


 


Eosinophils (%) (Auto)   0 % (0-3)  


 


Basophils (%) (Auto)   0 % (0-3)  


 


Neutrophils # (Auto)


 


 


 13.3 x10^3uL


(1.8-7.7) 





 


Lymphocytes # (Auto)


 


 


 1.5 x10^3/uL


(1.0-4.8) 





 


Monocytes # (Auto)


 


 


 1.1 x10^3/uL


(0.0-1.1) 





 


Eosinophils # (Auto)


 


 


 0.0 x10^3/uL


(0.0-0.7) 





 


Basophils # (Auto)


 


 


 0.1 x10^3/uL


(0.0-0.2) 





 


Sodium Level


 


 


 140 mmol/L


(136-145) 





 


Potassium Level


 


 


 4.4 mmol/L


(3.5-5.1) 





 


Chloride Level


 


 


 100 mmol/L


() 





 


Carbon Dioxide Level


 


 


 26 mmol/L


(21-32) 





 


Anion Gap   14 (6-14)  


 


Blood Urea Nitrogen


 


 


 17 mg/dL


(8-26) 





 


Creatinine


 


 


 1.3 mg/dL


(0.7-1.3) 





 


Estimated GFR


(Cockcroft-Gault) 


 


 60.0 


 





 


BUN/Creatinine Ratio   13 (6-20)  


 


Glucose Level


 


 


 378 mg/dL


(70-99) 





 


Calcium Level


 


 


 9.1 mg/dL


(8.5-10.1) 





 


Total Bilirubin


 


 


 0.5 mg/dL


(0.2-1.0) 





 


Aspartate Amino Transf


(AST/SGOT) 


 


 32 U/L (15-37) 


 





 


Alanine Aminotransferase


(ALT/SGPT) 


 


 112 U/L


(16-63) 





 


Alkaline Phosphatase


 


 


 146 U/L


() 





 


Total Protein


 


 


 7.0 g/dL


(6.4-8.2) 





 


Albumin


 


 


 2.6 g/dL


(3.4-5.0) 





 


Albumin/Globulin Ratio   0.6 (1.0-1.7)  








Laboratory Tests








Test


 10/16/18


15:01 10/16/18


16:34 10/16/18


21:23 10/17/18


06:25


 


Glucose (Fingerstick)


 224 mg/dL


(70-99) 268 mg/dL


(70-99) 260 mg/dL


(70-99) 336 mg/dL


(70-99)


 


Test


 10/17/18


08:09 10/17/18


08:10 10/17/18


11:45 





 


Glucose (Fingerstick)


 393 mg/dL


(70-99) 


 288 mg/dL


(70-99) 





 


White Blood Count


 


 16.0 x10^3/uL


(4.0-11.0) 


 





 


Red Blood Count


 


 3.85 x10^6/uL


(4.30-5.70) 


 





 


Hemoglobin


 


 11.7 g/dL


(13.0-17.5) 


 





 


Hematocrit


 


 33.9 %


(39.0-53.0) 


 





 


Mean Corpuscular Volume  88 fL ()   


 


Mean Corpuscular Hemoglobin  30 pg (25-35)   


 


Mean Corpuscular Hemoglobin


Concent 


 34 g/dL


(31-37) 


 





 


Red Cell Distribution Width


 


 13.5 %


(11.5-14.5) 


 





 


Platelet Count


 


 341 x10^3/uL


(140-400) 


 





 


Neutrophils (%) (Auto)  83 % (31-73)   


 


Lymphocytes (%) (Auto)  9 % (24-48)   


 


Monocytes (%) (Auto)  7 % (0-9)   


 


Eosinophils (%) (Auto)  0 % (0-3)   


 


Basophils (%) (Auto)  0 % (0-3)   


 


Neutrophils # (Auto)


 


 13.3 x10^3uL


(1.8-7.7) 


 





 


Lymphocytes # (Auto)


 


 1.5 x10^3/uL


(1.0-4.8) 


 





 


Monocytes # (Auto)


 


 1.1 x10^3/uL


(0.0-1.1) 


 





 


Eosinophils # (Auto)


 


 0.0 x10^3/uL


(0.0-0.7) 


 





 


Basophils # (Auto)


 


 0.1 x10^3/uL


(0.0-0.2) 


 





 


Sodium Level


 


 140 mmol/L


(136-145) 


 





 


Potassium Level


 


 4.4 mmol/L


(3.5-5.1) 


 





 


Chloride Level


 


 100 mmol/L


() 


 





 


Carbon Dioxide Level


 


 26 mmol/L


(21-32) 


 





 


Anion Gap  14 (6-14)   


 


Blood Urea Nitrogen


 


 17 mg/dL


(8-26) 


 





 


Creatinine


 


 1.3 mg/dL


(0.7-1.3) 


 





 


Estimated GFR


(Cockcroft-Gault) 


 60.0 


 


 





 


BUN/Creatinine Ratio  13 (6-20)   


 


Glucose Level


 


 378 mg/dL


(70-99) 


 





 


Calcium Level


 


 9.1 mg/dL


(8.5-10.1) 


 





 


Total Bilirubin


 


 0.5 mg/dL


(0.2-1.0) 


 





 


Aspartate Amino Transf


(AST/SGOT) 


 32 U/L (15-37) 


 


 





 


Alanine Aminotransferase


(ALT/SGPT) 


 112 U/L


(16-63) 


 





 


Alkaline Phosphatase


 


 146 U/L


() 


 





 


Total Protein


 


 7.0 g/dL


(6.4-8.2) 


 





 


Albumin


 


 2.6 g/dL


(3.4-5.0) 


 





 


Albumin/Globulin Ratio  0.6 (1.0-1.7)   








Problem List


Problems


Medical Problems:


(1) Cellulitis


Status: Acute  








Assessment/Plan


POD I and D perirectal abscess








continue wound care


Abx per ID











BARNDEE CRUZ MD Oct 17, 2018 14:53

## 2018-10-17 NOTE — HP
ADMIT DATE:  



CHIEF COMPLAINT:  Dizziness, arm numbness, recent UTI, recent ureteral stents,

recent kidney stone.



HISTORY OF PRESENT ILLNESS:  The patient is a pleasant middle-aged male who

basically had a kidney stone a week ago and after that he got a stent placed. 

He was sent home with antibiotics.  The next day, he had to be admitted for 3

days with a UTI.  He now went home, has been doing okay, but he is dizzy, his

arms are kind of numb.  He has some dysuria.  I discussed the case with ER

physician.  We are going to admit the patient, get him back on some IV

antibiotics and consult his urologist.



PAST MEDICAL HISTORY:  Kidney stone.



ALLERGIES:  None.



FAMILY HISTORY:  Hypertension.



SOCIAL HISTORY:  He does not drink, smoke or take drugs.  He states he is a

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

people can contribute to them.



MEDICATIONS:  Reviewed, please refer to the MRAD.



REVIEW OF SYSTEMS:

GENERAL:  No history of weight change, weakness or fevers.

SKIN:  No bruising, hair changes or rashes.

EYES:  No blurred, double or loss of vision.

NOSE AND THROAT:  No history of nosebleeds, hoarseness or sore throat.

HEART:  No history of palpitations, chest pain or shortness of breath on

exertion.

LUNGS:  Denies cough, hemoptysis, wheezing or shortness of breath.

GASTROINTESTINAL:  Denies changes in appetite, nausea, vomiting, diarrhea or

constipation.

GENITOURINARY:  No history of frequency, urgency, hesitancy or nocturia.

NEUROLOGIC:  Denies history of numbness, tingling, tremor or weakness.

PSYCHIATRIC:  No history of panic, anxiety or depression.

ENDOCRINE:  No history of heat or cold intolerance, polyuria or polydipsia.

EXTREMITIES:  Denies muscle weakness, joint pain, pain on walking or stiffness.



PHYSICAL EXAMINATION:

VITAL SIGNS:  Temperature afebrile, pulse 92, respirations 18, blood pressure

144/90.

GENERAL:  He is alert, cooperative.

HEART:  Normal S1, S2.

LUNGS:  Clear.

ABDOMEN:  Soft.

EXTREMITIES:  No edema.

SKIN:  No rashes.

ENDOCRINE:  No thyromegaly.

LYMPHATICS:  No cervical nodes.

HEMATOPOIETIC:  No bruising.



LABORATORY DATA:  Pending.



ASSESSMENT AND PLAN:  Recurrent urinary tract infection with a recent kidney

stone.  The patient will be admitted.  We will start IV antibiotics, IV fluids,

p.r.n. narcotics.  Continue home medicines.  Consult Urology.

 



______________________________

ELENA PRETTY DO



DR:  NELSON/lew  JOB#:  [id number] / [id number]

DD:  10/17/2018 11:21  DT:  10/17/2018 11:49

## 2018-10-17 NOTE — PDOC
Infectious Disease Note


Subjective


Subjective


Sore but ok


No F/C/s/N/V/d/SOA/Rash


Somewhat hungry





ROS


ROS


o/w neg





Vital Sign


Vital Signs





Vital Signs








  Date Time  Temp Pulse Resp B/P (MAP) Pulse Ox O2 Delivery O2 Flow Rate FiO2


 


10/17/18 03:00 97.6 80 18 158/90 (112) 91 Room Air  





 97.6       


 


10/16/18 17:04       4.0 











Physical Exam


PHYSICAL EXAM


CONSTITUTIONAL:  He is a very pleasant gentleman.  He is cooperative.  He is in


no acute distress.


HEENT:  Pupils equal and reactive with normal conjunctivae.  Oral cavity,


pharynx is clear.


NECK:  Supple with good range of motion.


LUNGS:  Clear to auscultation bilaterally.


HEART:  S1, S2.


ABDOMEN:  Soft, nontender, nondistended with positive bowel sounds.


EXTREMITIES:  Without clubbing, cyanosis or gross edema.


SKIN:  Warm to touch without signs of generalized rash.  In the left medial


buttock area, there is some induration and tenderness still.  Drain in place on 

the left


perirectal area.  There is no gross drainage.  There is no skin breakdown.


NEUROLOGIC:  He is nonfocal and appropriate.


PSYCHIATRIC:  Affect is pleasant.





Labs


Lab





Laboratory Tests








Test


 10/16/18


06:57 10/16/18


11:44 10/16/18


15:01 10/16/18


16:34


 


Glucose (Fingerstick)


 273 mg/dL


(70-99) 241 mg/dL


(70-99) 224 mg/dL


(70-99) 268 mg/dL


(70-99)


 


Test


 10/16/18


21:23 10/17/18


06:25 


 





 


Glucose (Fingerstick)


 260 mg/dL


(70-99) 336 mg/dL


(70-99) 


 











Micro





Microbiology


10/14/18 Blood Culture - Preliminary, Resulted


           NO GROWTH AFTER 1 DAY





Objective


Assessment


DID NOT SEE 10/16 AS WAS IN SURGERY








Leukocytosis now s/p Dexamethasone 10/16


Perirectal infection - s/p I and D 10/16


Transaminitis


Fever - resolved


DM





Plan


Plan of Care


Cont Zyvox po and cont Zosyn


F/u labs (ordered for this am) and cults - expect WBC may increase with surgery 

and Dexamethason











EUGENIO GAGNON MD Oct 17, 2018 06:37

## 2018-10-18 VITALS — SYSTOLIC BLOOD PRESSURE: 146 MMHG | DIASTOLIC BLOOD PRESSURE: 85 MMHG

## 2018-10-18 VITALS — SYSTOLIC BLOOD PRESSURE: 170 MMHG | DIASTOLIC BLOOD PRESSURE: 93 MMHG

## 2018-10-18 VITALS — SYSTOLIC BLOOD PRESSURE: 139 MMHG | DIASTOLIC BLOOD PRESSURE: 81 MMHG

## 2018-10-18 VITALS — DIASTOLIC BLOOD PRESSURE: 79 MMHG | SYSTOLIC BLOOD PRESSURE: 153 MMHG

## 2018-10-18 VITALS — SYSTOLIC BLOOD PRESSURE: 145 MMHG | DIASTOLIC BLOOD PRESSURE: 88 MMHG

## 2018-10-18 VITALS — DIASTOLIC BLOOD PRESSURE: 87 MMHG | SYSTOLIC BLOOD PRESSURE: 142 MMHG

## 2018-10-18 RX ADMIN — INSULIN LISPRO SCH UNITS: 100 INJECTION, SOLUTION INTRAVENOUS; SUBCUTANEOUS at 17:00

## 2018-10-18 RX ADMIN — HYDROCODONE BITARTRATE AND ACETAMINOPHEN PRN TAB: 5; 325 TABLET ORAL at 21:24

## 2018-10-18 RX ADMIN — INSULIN LISPRO SCH UNITS: 100 INJECTION, SOLUTION INTRAVENOUS; SUBCUTANEOUS at 12:00

## 2018-10-18 RX ADMIN — PIPERACILLIN SODIUM AND TAZOBACTAM SODIUM SCH MLS/HR: 3; .375 INJECTION, POWDER, LYOPHILIZED, FOR SOLUTION INTRAVENOUS at 05:29

## 2018-10-18 RX ADMIN — PIPERACILLIN SODIUM AND TAZOBACTAM SODIUM SCH MLS/HR: 3; .375 INJECTION, POWDER, LYOPHILIZED, FOR SOLUTION INTRAVENOUS at 23:41

## 2018-10-18 RX ADMIN — INSULIN GLARGINE SCH UNITS: 100 INJECTION, SOLUTION SUBCUTANEOUS at 21:13

## 2018-10-18 RX ADMIN — INSULIN LISPRO SCH UNITS: 100 INJECTION, SOLUTION INTRAVENOUS; SUBCUTANEOUS at 08:44

## 2018-10-18 RX ADMIN — ENOXAPARIN SODIUM SCH MG: 40 INJECTION SUBCUTANEOUS at 14:00

## 2018-10-18 RX ADMIN — LINEZOLID SCH MG: 600 TABLET, FILM COATED ORAL at 08:36

## 2018-10-18 RX ADMIN — PIPERACILLIN SODIUM AND TAZOBACTAM SODIUM SCH MLS/HR: 3; .375 INJECTION, POWDER, LYOPHILIZED, FOR SOLUTION INTRAVENOUS at 12:20

## 2018-10-18 RX ADMIN — HYDROCODONE BITARTRATE AND ACETAMINOPHEN PRN TAB: 5; 325 TABLET ORAL at 12:20

## 2018-10-18 RX ADMIN — DOCUSATE SODIUM SCH MG: 100 CAPSULE, LIQUID FILLED ORAL at 09:00

## 2018-10-18 RX ADMIN — Medication SCH CAP: at 21:11

## 2018-10-18 RX ADMIN — PIPERACILLIN SODIUM AND TAZOBACTAM SODIUM SCH MLS/HR: 3; .375 INJECTION, POWDER, LYOPHILIZED, FOR SOLUTION INTRAVENOUS at 17:20

## 2018-10-18 RX ADMIN — HYDROCODONE BITARTRATE AND ACETAMINOPHEN PRN TAB: 5; 325 TABLET ORAL at 17:20

## 2018-10-18 RX ADMIN — HYDROCODONE BITARTRATE AND ACETAMINOPHEN PRN TAB: 5; 325 TABLET ORAL at 05:32

## 2018-10-18 RX ADMIN — CELECOXIB SCH MG: 100 CAPSULE ORAL at 21:11

## 2018-10-18 RX ADMIN — INSULIN LISPRO SCH UNITS: 100 INJECTION, SOLUTION INTRAVENOUS; SUBCUTANEOUS at 08:43

## 2018-10-18 RX ADMIN — LINEZOLID SCH MG: 600 TABLET, FILM COATED ORAL at 21:11

## 2018-10-18 RX ADMIN — INSULIN LISPRO SCH UNITS: 100 INJECTION, SOLUTION INTRAVENOUS; SUBCUTANEOUS at 12:26

## 2018-10-18 RX ADMIN — POLYETHYLENE GLYCOL 3350 SCH GM: 17 POWDER, FOR SOLUTION ORAL at 09:00

## 2018-10-18 RX ADMIN — Medication SCH CAP: at 08:36

## 2018-10-18 RX ADMIN — DOCUSATE SODIUM SCH MG: 100 CAPSULE, LIQUID FILLED ORAL at 21:11

## 2018-10-18 RX ADMIN — CELECOXIB SCH MG: 100 CAPSULE ORAL at 08:36

## 2018-10-18 NOTE — PDOC
PROGRESS NOTES


Chief Complaint


Chief Complaint


 


Left perineal cellulitis/abscess s/p i and d  10/17


Sepsis  


Diabetes 1, insulin req.  with unknown hemoglobin A1c -  


rectal pain, cannot void


HTN





plan:


fu with id, sx


has wound penrose drain , keep to home


on zyvox, zosyn, fu cx


pain control,add tramadol prn


cont lantus 18u qhs, decrease humolog to 20u tid given pt refused lantus today 

with hyperglycemia, ssi


add dvt ppx








History of Present Illness


History of Present Illness


Pt seen and examined


VSS








i and d  10./17, pain is better, cx pending


wbc still high as 16


refused lantus today


lantus not given last night given low glucose





Vitals


Vitals





Vital Signs








  Date Time  Temp Pulse Resp B/P (MAP) Pulse Ox O2 Delivery O2 Flow Rate FiO2


 


10/18/18 12:20      Room Air  


 


10/18/18 07:00 97.5 63 20 153/79 (103) 95   





 97.5       


 


10/18/18 06:32       4.0 











Physical Exam


Physical Exam


CONSTITUTIONAL:  He is a very pleasant gentleman.  He is cooperative.  He is in


no acute distress.


HEENT:  Pupils equal and reactive with normal conjunctivae.  Oral cavity,


pharynx is clear.


NECK:  Supple with good range of motion.


LUNGS:  Clear to auscultation bilaterally.


HEART:  S1, S2.


ABDOMEN:  Soft, nontender, nondistended with positive bowel sounds.


EXTREMITIES:  Without clubbing, cyanosis or gross edema.


SKIN:  Warm to touch without signs of generalized rash. 


perirectal area.  penrose drain. 


NEUROLOGIC:  He is nonfocal and appropriate.


PSYCHIATRIC:  Affect is pleasant.


General:  Alert, Oriented X3, Cooperative, No acute distress


Heart:  Regular rate


Abdomen:  Soft


Extremities:  No clubbing, No cyanosis, No edema, Normal pulses, No tenderness/

swelling


Skin:  Other (perirectal/perineal area less erythematous, less indurated)





Labs


LABS





Laboratory Tests








Test


 10/17/18


16:42 10/17/18


21:12 10/17/18


21:37 10/18/18


08:26


 


Glucose (Fingerstick)


 154 mg/dL


(70-99) 41 mg/dL


(70-99) 82 mg/dL


(70-99) 265 mg/dL


(70-99)











Assessment and Plan


Assessmemt and Plan


Problems


Medical Problems:


(1) Cellulitis


Status: Acute  











Comment


Review of Relevant


I have reviewed the following items itz (where applicable) has been applied.


Labs





Laboratory Tests








Test


 10/16/18


15:01 10/16/18


16:34 10/16/18


21:23 10/17/18


06:25


 


Glucose (Fingerstick)


 224 mg/dL


(70-99) 268 mg/dL


(70-99) 260 mg/dL


(70-99) 336 mg/dL


(70-99)


 


Test


 10/17/18


08:09 10/17/18


08:10 10/17/18


11:45 10/17/18


16:42


 


Glucose (Fingerstick)


 393 mg/dL


(70-99) 


 288 mg/dL


(70-99) 154 mg/dL


(70-99)


 


White Blood Count


 


 16.0 x10^3/uL


(4.0-11.0) 


 





 


Red Blood Count


 


 3.85 x10^6/uL


(4.30-5.70) 


 





 


Hemoglobin


 


 11.7 g/dL


(13.0-17.5) 


 





 


Hematocrit


 


 33.9 %


(39.0-53.0) 


 





 


Mean Corpuscular Volume  88 fL ()   


 


Mean Corpuscular Hemoglobin  30 pg (25-35)   


 


Mean Corpuscular Hemoglobin


Concent 


 34 g/dL


(31-37) 


 





 


Red Cell Distribution Width


 


 13.5 %


(11.5-14.5) 


 





 


Platelet Count


 


 341 x10^3/uL


(140-400) 


 





 


Neutrophils (%) (Auto)  83 % (31-73)   


 


Lymphocytes (%) (Auto)  9 % (24-48)   


 


Monocytes (%) (Auto)  7 % (0-9)   


 


Eosinophils (%) (Auto)  0 % (0-3)   


 


Basophils (%) (Auto)  0 % (0-3)   


 


Neutrophils # (Auto)


 


 13.3 x10^3uL


(1.8-7.7) 


 





 


Lymphocytes # (Auto)


 


 1.5 x10^3/uL


(1.0-4.8) 


 





 


Monocytes # (Auto)


 


 1.1 x10^3/uL


(0.0-1.1) 


 





 


Eosinophils # (Auto)


 


 0.0 x10^3/uL


(0.0-0.7) 


 





 


Basophils # (Auto)


 


 0.1 x10^3/uL


(0.0-0.2) 


 





 


Sodium Level


 


 140 mmol/L


(136-145) 


 





 


Potassium Level


 


 4.4 mmol/L


(3.5-5.1) 


 





 


Chloride Level


 


 100 mmol/L


() 


 





 


Carbon Dioxide Level


 


 26 mmol/L


(21-32) 


 





 


Anion Gap  14 (6-14)   


 


Blood Urea Nitrogen


 


 17 mg/dL


(8-26) 


 





 


Creatinine


 


 1.3 mg/dL


(0.7-1.3) 


 





 


Estimated GFR


(Cockcroft-Gault) 


 60.0 


 


 





 


BUN/Creatinine Ratio  13 (6-20)   


 


Glucose Level


 


 378 mg/dL


(70-99) 


 





 


Calcium Level


 


 9.1 mg/dL


(8.5-10.1) 


 





 


Total Bilirubin


 


 0.5 mg/dL


(0.2-1.0) 


 





 


Aspartate Amino Transf


(AST/SGOT) 


 32 U/L (15-37) 


 


 





 


Alanine Aminotransferase


(ALT/SGPT) 


 112 U/L


(16-63) 


 





 


Alkaline Phosphatase


 


 146 U/L


() 


 





 


Total Protein


 


 7.0 g/dL


(6.4-8.2) 


 





 


Albumin


 


 2.6 g/dL


(3.4-5.0) 


 





 


Albumin/Globulin Ratio  0.6 (1.0-1.7)   


 


Test


 10/17/18


21:12 10/17/18


21:37 10/18/18


08:26 





 


Glucose (Fingerstick)


 41 mg/dL


(70-99) 82 mg/dL


(70-99) 265 mg/dL


(70-99) 











Laboratory Tests








Test


 10/17/18


16:42 10/17/18


21:12 10/17/18


21:37 10/18/18


08:26


 


Glucose (Fingerstick)


 154 mg/dL


(70-99) 41 mg/dL


(70-99) 82 mg/dL


(70-99) 265 mg/dL


(70-99)








Microbiology


10/14/18 Blood Culture - Preliminary, Resulted


           NO GROWTH AFTER 4 DAYS


10/16/18 Anaerobic/Aerobic Culture, Resulted


           Pending


10/16/18 Anaerobic Culture Result 1 (ERIC), Resulted


           Pending


10/16/18 Aerobic Culture, Resulted


           Pending


10/16/18 Aerobic Culture Result 1 (ERIC), Resulted


           Pending


10/16/18 Gram Stain - Final, Resulted


           


10/16/18 Gram Stain Result 1 (ERIC) - Final, Resulted


           


10/16/18 Gram Stain Result 2 (ERIC) - Final, Resulted


Medications





Current Medications


Fentanyl Citrate (Fentanyl 2ml Vial) 25 mcg PRN Q15MIN  PRN IV PAIN GREATER 

THAN 3/10 Last administered on 10/14/18at 14:14;  Start 10/14/18 at 10:30;  

Stop 10/15/18 at 10:29;  Status DC


Sodium Chloride 1,000 ml @  1,000 mls/hr Q1H IV  Last administered on 10/14/

18at 10:39;  Start 10/14/18 at 10:22;  Stop 10/14/18 at 11:21;  Status DC


Ondansetron HCl (Zofran) 4 mg 1X  ONCE IV  Last administered on 10/14/18at 10:45

;  Start 10/14/18 at 11:00;  Stop 10/14/18 at 11:01;  Status DC


Vancomycin HCl 250 ml @  250 mls/hr 1X  ONCE IV ;  Start 10/14/18 at 10:30;  

Stop 10/14/18 at 10:31;  Status DC


Piperacillin Sod/ Tazobactam Sod 3.375 gm/Sodium Chloride 50 ml @  100 mls/hr 

1X  ONCE IV  Last administered on 10/14/18at 11:04;  Start 10/14/18 at 11:00;  

Stop 10/14/18 at 11:29;  Status DC


Vancomycin HCl 1.75 gm/Sodium Chloride 500 ml @  250 mls/hr 1X  ONCE IV  Last 

administered on 10/14/18at 11:05;  Start 10/14/18 at 11:30;  Stop 10/14/18 at 13

:29;  Status DC


Iohexol (Omnipaque 300 Mg/ml) 75 ml 1X  ONCE IV ;  Start 10/14/18 at 11:45;  

Stop 10/14/18 at 11:48;  Status DC


Info (CONTRAST GIVEN -- Rx MONITORING) 1 each PRN DAILY  PRN MC SEE COMMENTS;  

Start 10/14/18 at 12:00;  Stop 10/16/18 at 11:59;  Status DC


Ondansetron HCl (Zofran) 4 mg PRN Q8HRS  PRN IV NAUSEA/VOMITING;  Start 10/14/

18 at 12:45;  Stop 10/14/18 at 12:55;  Status DC


Morphine Sulfate (Morphine Sulfate) 4 mg PRN Q2HR  PRN IV PAIN Last 

administered on 10/14/18at 17:32;  Start 10/14/18 at 12:45;  Stop 10/15/18 at 12

:44;  Status DC


Sodium Chloride 1,000 ml @  75 mls/hr [id number] IV  Last administered on 10/15/

18at 05:52;  Start 10/14/18 at 12:32;  Stop 10/15/18 at 12:31;  Status DC


Ondansetron HCl (Zofran) 4 mg PRN Q6HRS  PRN IV NAUSEA/VOMITING Last 

administered on 10/16/18at 07:08;  Start 10/14/18 at 13:00


Celecoxib (CeleBREX) 100 mg BID PO  Last administered on 10/18/18at 08:36;  

Start 10/14/18 at 21:00


Acetaminophen/ Hydrocodone Bitart (Lortab 5/325) 1 tab PRN Q4HRS  PRN PO MILD 

TO MODERATE PAIN Last administered on 10/18/18at 12:20;  Start 10/14/18 at 13:00


Diphenhydramine HCl (Benadryl) 25 mg PRN QHS  PRN PO INSOMNIA Last administered 

on 10/15/18at 21:54;  Start 10/14/18 at 13:00


Insulin Human Lispro (HumaLOG) 0-9 UNITS TIDWMEALS SQ  Last administered on 10/

18/18at 08:43;  Start 10/14/18 at 17:00


Dextrose (Dextrose 50%-Water Syringe) 12.5 gm PRN Q15MIN  PRN IV SEE COMMENTS;  

Start 10/14/18 at 13:00


Insulin Glargine (Lantus) 18 units QHS SQ  Last administered on 10/14/18at 20:57

;  Start 10/14/18 at 21:00;  Stop 10/15/18 at 10:13;  Status DC


Insulin Human Lispro (HumaLOG) 15 units TIDWMEALS SQ  Last administered on 10/15

/18at 09:19;  Start 10/14/18 at 17:00;  Stop 10/15/18 at 10:13;  Status DC


Acetaminophen/ Hydrocodone Bitart (Lortab 5/325) 1 tab PRN Q6HRS  PRN PO PAIN;  

Start 10/14/18 at 13:00;  Status UNV


Clindamycin Phosphate 50 ml @  100 mls/hr Q8HRS IV  Last administered on 10/15/

18at 05:52;  Start 10/14/18 at 22:00;  Stop 10/15/18 at 11:28;  Status DC


Acetaminophen (Tylenol) 650 mg PRN Q6HRS  PRN PO FEVER Last administered on 10/

15/18at 17:57;  Start 10/14/18 at 21:15


Docusate Sodium (Colace) 100 mg BID PO  Last administered on 10/17/18at 08:37;  

Start 10/14/18 at 22:00


Polyethylene Glycol (miraLAX PACKET) 17 gm DAILY PO  Last administered on 10/15/

18at 09:09;  Start 10/14/18 at 22:00


Magnesium Hydroxide (Milk Of Magnesia) 2,400 mg PRN DAILY  PRN PO CONSTIPATION 

1ST CHOICE;  Start 10/14/18 at 21:15


Influenza Virus Vaccine (Afluria Trivalent [id number] Syringe) 0.5 ml ONCE ONCE 

VAX IM ;  Start 10/15/18 at 09:00;  Stop 10/15/18 at 09:01;  Status DC


Insulin Glargine (Lantus) 22 units QHS SQ ;  Start 10/15/18 at 21:00;  Stop 10/

15/18 at 21:00;  Status DC


Insulin Human Lispro (HumaLOG) 20 units TIDWMEALS SQ  Last administered on 10/17

/18at 08:44;  Start 10/15/18 at 12:00;  Stop 10/17/18 at 09:39;  Status DC


Insulin Glargine (Lantus) 18 units QHS SQ  Last administered on 10/16/18at 21:50

;  Start 10/15/18 at 21:00


Piperacillin Sod/ Tazobactam Sod 3.375 gm/Sodium Chloride 50 ml @  100 mls/hr 

Q6HRS IV  Last administered on 10/18/18at 12:20;  Start 10/15/18 at 12:00


Linezolid (Zyvox) 600 mg BID PO  Last administered on 10/18/18at 08:36;  Start 

10/15/18 at 12:00


Lactobacillus Rhamnosus (Culturelle) 1 cap BID PO  Last administered on 10/18/

18at 08:36;  Start 10/15/18 at 12:00


Ondansetron HCl (Zofran) 4 mg PRN Q6HRS  PRN IV NAUSEA/VOMITING;  Start 10/16/

18 at 07:00;  Stop 10/17/18 at 06:59;  Status DC


Fentanyl Citrate (Fentanyl 2ml Vial) 25 mcg PRN Q5MIN  PRN IV MILD PAIN;  Start 

10/16/18 at 07:00;  Stop 10/17/18 at 06:59;  Status DC


Fentanyl Citrate (Fentanyl 2ml Vial) 50 mcg PRN Q5MIN  PRN IV MODERATE TO 

SEVERE PAIN Last administered on 10/16/18at 16:03;  Start 10/16/18 at 07:00;  

Stop 10/17/18 at 06:59;  Status DC


Morphine Sulfate (Morphine Sulfate) 1 mg PRN Q10MIN  PRN IV SEVERE PAIN;  Start 

10/16/18 at 07:00;  Stop 10/17/18 at 06:59;  Status DC


Ringer's Solution 1,000 ml @  30 mls/hr Q24H IV  Last administered on 10/16/

18at 12:18;  Start 10/16/18 at 07:00;  Stop 10/16/18 at 18:59;  Status DC


Lidocaine HCl (Xylocaine-Mpf 1% 2ml Vial) 2 ml PRN 1X  PRN ID PRIOR TO IV START

;  Start 10/16/18 at 07:00;  Stop 10/17/18 at 06:59;  Status DC


Hydromorphone HCl (Dilaudid) 0.5 mg PRN Q10MIN  PRN IV SEV PAIN, Second choice;

  Start 10/16/18 at 07:00;  Stop 10/17/18 at 06:59;  Status DC


Prochlorperazine Edisylate (Compazine) 5 mg PACU PRN  PRN IV NAUSEA, MRX1;  

Start 10/16/18 at 07:00;  Stop 10/17/18 at 06:59;  Status DC


Morphine Sulfate (Morphine Sulfate) 2 mg PRN Q2HR  PRN IV PAIN SEVERE Last 

administered on 10/16/18at 21:13;  Start 10/16/18 at 10:45


Propofol 20 ml @ As Directed STK-MED ONCE IV ;  Start 10/16/18 at 11:55;  Stop 

10/16/18 at 11:56;  Status DC


Dexamethasone Sodium Phosphate (Decadron) 20 mg STK-MED ONCE .ROUTE ;  Start 10/

16/18 at 11:55;  Stop 10/16/18 at 11:56;  Status DC


Lidocaine HCl (Lidocaine Pf 2% Vial) 5 ml STK-MED ONCE .ROUTE ;  Start 10/16/18 

at 11:55;  Stop 10/16/18 at 11:56;  Status DC


Ondansetron HCl (Zofran) 4 mg STK-MED ONCE .ROUTE ;  Start 10/16/18 at 11:55;  

Stop 10/16/18 at 11:56;  Status DC


Fentanyl Citrate (Fentanyl 2ml Vial) 100 mcg STK-MED ONCE .ROUTE ;  Start 10/16/

18 at 11:55;  Stop 10/16/18 at 11:56;  Status DC


Midazolam HCl (Versed) 2 mg STK-MED ONCE .ROUTE ;  Start 10/16/18 at 12:00;  

Stop 10/16/18 at 12:01;  Status DC


Bupivacaine HCl/ Epinephrine Bitart (Sensorcain-Mpf Epi 0.5%-1:[id number]) 30 ml STK

-MED ONCE .ROUTE  Last administered on 10/16/18at 13:49;  Start 10/16/18 at 12:

26;  Stop 10/16/18 at 13:27;  Status DC


Esmolol HCl (Brevibloc) 100 mg STK-MED ONCE IV ;  Start 10/16/18 at 13:41;  

Stop 10/16/18 at 13:42;  Status DC


Cellulose (Surgicel Hemostat 2x3) 1 each STK-MED ONCE .ROUTE  Last administered 

on 10/16/18at 14:03;  Start 10/16/18 at 12:48;  Stop 10/16/18 at 13:48;  Status 

DC


Sevoflurane (Ultane) 30 ml STK-MED ONCE IH ;  Start 10/16/18 at 13:51;  Stop 10/

16/18 at 13:52;  Status DC


Epinephrine HCl (Adrenalin) 30 mg STK-MED ONCE .ROUTE  Last administered on 10/

16/18at 14:04;  Start 10/16/18 at 12:57;  Stop 10/16/18 at 13:57;  Status DC


Epinephrine HCl (EPINEPHrine SYRINGE) 1 mg STK-MED ONCE .ROUTE ;  Start 10/16/

18 at 13:59;  Stop 10/16/18 at 14:00;  Status DC


Albuterol Sulfate (Ventolin Neb Soln) 2.5 mg 1X  ONCE NEB  Last administered on 

10/16/18at 14:32;  Start 10/16/18 at 14:30;  Stop 10/16/18 at 14:31;  Status DC


Furosemide (Lasix) 10 mg 1X  ONCE IVP  Last administered on 10/16/18at 14:43;  

Start 10/16/18 at 14:45;  Stop 10/16/18 at 14:46;  Status DC


Furosemide (Lasix) 10 mg 1X  ONCE IVP ;  Start 10/16/18 at 15:30;  Stop 10/16/

18 at 15:33;  Status DC


Insulin Human Lispro (HumaLOG VIAL) 3 unit 1X  ONCE SQ  Last administered on 10/

16/18at 15:34;  Start 10/16/18 at 15:30;  Stop 10/16/18 at 15:33;  Status DC


Insulin Human Lispro (HumaLOG) 25 units TIDWMEALS SQ  Last administered on 10/18

/18at 08:44;  Start 10/17/18 at 12:00;  Stop 10/18/18 at 08:46;  Status DC


Insulin Human Lispro (HumaLOG) 20 units TIDWMEALS SQ  Last administered on 10/18

/18at 12:26;  Start 10/18/18 at 12:00


Insulin Glargine (Lantus) 15 units 1X  ONCE SQ ;  Start 10/18/18 at 09:30;  

Stop 10/18/18 at 09:31;  Status DC





Active Scripts


Active


Norco 5-325 Tablet (Acetaminophen/Hydrocodone Bitart) 1 Each Tablet 1 Tab PO 

PRN Q6HRS PRN


Bactrim Ds Tablet (Sulfamethoxazole/Trimethoprim) 1 Each Tablet 1 Tab PO BID


Reported


Humalog (Insulin Lispro) 100 Unit/1 Ml Cartridge 100 Unit SQ 


Lantus Solostar (Insulin Glargine,Hum.rec.anlog) 100 Unit/1 Ml Insuln.pen 10 

Unit SQ QHS


Vitals/I & O





Vital Sign - Last 24 Hours








 10/17/18 10/17/18 10/17/18 10/17/18





 15:00 17:30 19:00 20:00


 


Temp 97.9  97.7 





 97.9  97.7 


 


Pulse 94  86 


 


Resp 18  18 


 


B/P (MAP) 131/82 (98)  144/87 (106) 


 


Pulse Ox 94  94 


 


O2 Delivery Room Air Room Air Room Air Room Air


 


O2 Flow Rate    4.0


 


    





    





 10/17/18 10/17/18 10/18/18 10/18/18





 23:00 23:47 03:00 05:32


 


Temp 99.3  96.1 





 99.3  96.1 


 


Pulse 84  77 


 


Resp 18  18 


 


B/P (MAP) 146/92 (110)  145/88 (107) 


 


Pulse Ox 93 93 97 97


 


O2 Delivery Room Air Room Air Room Air Room Air


 


O2 Flow Rate  4.0  4.0


 


    





    





 10/18/18 10/18/18 10/18/18 10/18/18





 06:32 07:00 08:30 12:20


 


Temp  97.5  





  97.5  


 


Pulse  63  


 


Resp  20  


 


B/P (MAP)  153/79 (103)  


 


Pulse Ox 97 95  


 


O2 Delivery Room Air Room Air Room Air Room Air


 


O2 Flow Rate 4.0   














Intake and Output   


 


 10/17/18 10/17/18 10/18/18





 15:00 23:00 07:00


 


Intake Total 460 ml 230 ml 100 ml


 


Balance 460 ml 230 ml 100 ml

















KELLI MARTINEZ MD Oct 18, 2018 12:44

## 2018-10-18 NOTE — PDOC
Infectious Disease Note


Subjective


Subjective


Sore but improving in the perirectal area.  Less swelling


Has some aches here and there


No F/C/s/N/V/d/SOA/Rash


Somewhat hungry





ROS


ROS


o/w neg





Vital Sign


Vital Signs





Vital Signs








  Date Time  Temp Pulse Resp B/P (MAP) Pulse Ox O2 Delivery O2 Flow Rate FiO2


 


10/18/18 05:32     97 Room Air 4.0 


 


10/18/18 03:00 96.1 77 18 145/88 (107)    





 96.1       











Physical Exam


PHYSICAL EXAM


CONSTITUTIONAL:  He is a very pleasant gentleman.  He is cooperative.  He is in


no acute distress.


HEENT:  Pupils equal and reactive with normal conjunctivae.  Oral cavity,


pharynx is clear.


NECK:  Supple with good range of motion.


LUNGS:  Clear to auscultation bilaterally.


HEART:  S1, S2.


ABDOMEN:  Soft, nontender, nondistended with positive bowel sounds.


EXTREMITIES:  Without clubbing, cyanosis or gross edema.


SKIN:  Warm to touch without signs of generalized rash. 


perirectal area.  There is no gross drainage.  There is no skin breakdown.


NEUROLOGIC:  He is nonfocal and appropriate.


PSYCHIATRIC:  Affect is pleasant.





Labs


Lab





Laboratory Tests








Test


 10/17/18


08:09 10/17/18


08:10 10/17/18


11:45 10/17/18


16:42


 


Glucose (Fingerstick)


 393 mg/dL


(70-99) 


 288 mg/dL


(70-99) 154 mg/dL


(70-99)


 


White Blood Count


 


 16.0 x10^3/uL


(4.0-11.0) 


 





 


Red Blood Count


 


 3.85 x10^6/uL


(4.30-5.70) 


 





 


Hemoglobin


 


 11.7 g/dL


(13.0-17.5) 


 





 


Hematocrit


 


 33.9 %


(39.0-53.0) 


 





 


Mean Corpuscular Volume  88 fL ()   


 


Mean Corpuscular Hemoglobin  30 pg (25-35)   


 


Mean Corpuscular Hemoglobin


Concent 


 34 g/dL


(31-37) 


 





 


Red Cell Distribution Width


 


 13.5 %


(11.5-14.5) 


 





 


Platelet Count


 


 341 x10^3/uL


(140-400) 


 





 


Neutrophils (%) (Auto)  83 % (31-73)   


 


Lymphocytes (%) (Auto)  9 % (24-48)   


 


Monocytes (%) (Auto)  7 % (0-9)   


 


Eosinophils (%) (Auto)  0 % (0-3)   


 


Basophils (%) (Auto)  0 % (0-3)   


 


Neutrophils # (Auto)


 


 13.3 x10^3uL


(1.8-7.7) 


 





 


Lymphocytes # (Auto)


 


 1.5 x10^3/uL


(1.0-4.8) 


 





 


Monocytes # (Auto)


 


 1.1 x10^3/uL


(0.0-1.1) 


 





 


Eosinophils # (Auto)


 


 0.0 x10^3/uL


(0.0-0.7) 


 





 


Basophils # (Auto)


 


 0.1 x10^3/uL


(0.0-0.2) 


 





 


Sodium Level


 


 140 mmol/L


(136-145) 


 





 


Potassium Level


 


 4.4 mmol/L


(3.5-5.1) 


 





 


Chloride Level


 


 100 mmol/L


() 


 





 


Carbon Dioxide Level


 


 26 mmol/L


(21-32) 


 





 


Anion Gap  14 (6-14)   


 


Blood Urea Nitrogen


 


 17 mg/dL


(8-26) 


 





 


Creatinine


 


 1.3 mg/dL


(0.7-1.3) 


 





 


Estimated GFR


(Cockcroft-Gault) 


 60.0 


 


 





 


BUN/Creatinine Ratio  13 (6-20)   


 


Glucose Level


 


 378 mg/dL


(70-99) 


 





 


Calcium Level


 


 9.1 mg/dL


(8.5-10.1) 


 





 


Total Bilirubin


 


 0.5 mg/dL


(0.2-1.0) 


 





 


Aspartate Amino Transf


(AST/SGOT) 


 32 U/L (15-37) 


 


 





 


Alanine Aminotransferase


(ALT/SGPT) 


 112 U/L


(16-63) 


 





 


Alkaline Phosphatase


 


 146 U/L


() 


 





 


Total Protein


 


 7.0 g/dL


(6.4-8.2) 


 





 


Albumin


 


 2.6 g/dL


(3.4-5.0) 


 





 


Albumin/Globulin Ratio  0.6 (1.0-1.7)   


 


Test


 10/17/18


21:12 10/17/18


21:37 


 





 


Glucose (Fingerstick)


 41 mg/dL


(70-99) 82 mg/dL


(70-99) 


 











Micro





Microbiology


10/14/18 Blood Culture - Preliminary, Resulted


           NO GROWTH AFTER 1 DAY





Objective


Assessment





Leukocytosis now s/p Dexamethasone 10/16


Perirectal infection - s/p I and D 10/16 - cults pending


Transaminitis  improved


Fever - resolved


DM





Plan


Plan of Care


Cont Zyvox po and cont Zosyn cults - change to po when cults return


F/u labs (ordered for this am) and cults - expect WBC may increase with surgery 

and Dexamethason











EUGENIO GAGNON MD Oct 18, 2018 07:16

## 2018-10-19 VITALS — DIASTOLIC BLOOD PRESSURE: 90 MMHG | SYSTOLIC BLOOD PRESSURE: 160 MMHG

## 2018-10-19 VITALS — SYSTOLIC BLOOD PRESSURE: 153 MMHG | DIASTOLIC BLOOD PRESSURE: 97 MMHG

## 2018-10-19 VITALS — DIASTOLIC BLOOD PRESSURE: 86 MMHG | SYSTOLIC BLOOD PRESSURE: 157 MMHG

## 2018-10-19 VITALS — DIASTOLIC BLOOD PRESSURE: 93 MMHG | SYSTOLIC BLOOD PRESSURE: 159 MMHG

## 2018-10-19 VITALS — DIASTOLIC BLOOD PRESSURE: 74 MMHG | SYSTOLIC BLOOD PRESSURE: 146 MMHG

## 2018-10-19 VITALS — SYSTOLIC BLOOD PRESSURE: 160 MMHG | DIASTOLIC BLOOD PRESSURE: 86 MMHG

## 2018-10-19 LAB
ANION GAP SERPL CALC-SCNC: 10 MMOL/L (ref 6–14)
BASOPHILS # BLD AUTO: 0.1 X10^3/UL (ref 0–0.2)
BASOPHILS NFR BLD: 1 % (ref 0–3)
BUN SERPL-MCNC: 19 MG/DL (ref 8–26)
CALCIUM SERPL-MCNC: 8.6 MG/DL (ref 8.5–10.1)
CHLORIDE SERPL-SCNC: 103 MMOL/L (ref 98–107)
CO2 SERPL-SCNC: 28 MMOL/L (ref 21–32)
CREAT SERPL-MCNC: 1.2 MG/DL (ref 0.7–1.3)
EOSINOPHIL NFR BLD: 0.5 X10^3/UL (ref 0–0.7)
EOSINOPHIL NFR BLD: 5 % (ref 0–3)
ERYTHROCYTE [DISTWIDTH] IN BLOOD BY AUTOMATED COUNT: 13.4 % (ref 11.5–14.5)
GFR SERPLBLD BASED ON 1.73 SQ M-ARVRAT: 65.8 ML/MIN
GLUCOSE SERPL-MCNC: 236 MG/DL (ref 70–99)
HCT VFR BLD CALC: 34.7 % (ref 39–53)
HGB BLD-MCNC: 12.1 G/DL (ref 13–17.5)
LYMPHOCYTES # BLD: 2.9 X10^3/UL (ref 1–4.8)
LYMPHOCYTES NFR BLD AUTO: 29 % (ref 24–48)
MCH RBC QN AUTO: 31 PG (ref 25–35)
MCHC RBC AUTO-ENTMCNC: 35 G/DL (ref 31–37)
MCV RBC AUTO: 89 FL (ref 79–100)
MONO #: 1 X10^3/UL (ref 0–1.1)
MONOCYTES NFR BLD: 10 % (ref 0–9)
NEUT #: 5.8 X10^3UL (ref 1.8–7.7)
NEUTROPHILS NFR BLD AUTO: 56 % (ref 31–73)
PLATELET # BLD AUTO: 393 X10^3/UL (ref 140–400)
POTASSIUM SERPL-SCNC: 4.1 MMOL/L (ref 3.5–5.1)
RBC # BLD AUTO: 3.92 X10^6/UL (ref 4.3–5.7)
SODIUM SERPL-SCNC: 141 MMOL/L (ref 136–145)
WBC # BLD AUTO: 10.2 X10^3/UL (ref 4–11)

## 2018-10-19 RX ADMIN — PIPERACILLIN SODIUM AND TAZOBACTAM SODIUM SCH MLS/HR: 3; .375 INJECTION, POWDER, LYOPHILIZED, FOR SOLUTION INTRAVENOUS at 17:39

## 2018-10-19 RX ADMIN — DOCUSATE SODIUM SCH MG: 100 CAPSULE, LIQUID FILLED ORAL at 08:46

## 2018-10-19 RX ADMIN — INSULIN LISPRO SCH UNITS: 100 INJECTION, SOLUTION INTRAVENOUS; SUBCUTANEOUS at 17:47

## 2018-10-19 RX ADMIN — HYDROCODONE BITARTRATE AND ACETAMINOPHEN PRN TAB: 5; 325 TABLET ORAL at 09:48

## 2018-10-19 RX ADMIN — CELECOXIB SCH MG: 100 CAPSULE ORAL at 20:53

## 2018-10-19 RX ADMIN — Medication SCH CAP: at 08:46

## 2018-10-19 RX ADMIN — HYDROCODONE BITARTRATE AND ACETAMINOPHEN PRN TAB: 5; 325 TABLET ORAL at 15:32

## 2018-10-19 RX ADMIN — ENOXAPARIN SODIUM SCH MG: 40 INJECTION SUBCUTANEOUS at 14:00

## 2018-10-19 RX ADMIN — INSULIN GLARGINE SCH UNITS: 100 INJECTION, SOLUTION SUBCUTANEOUS at 21:42

## 2018-10-19 RX ADMIN — POLYETHYLENE GLYCOL 3350 SCH GM: 17 POWDER, FOR SOLUTION ORAL at 08:48

## 2018-10-19 RX ADMIN — INSULIN LISPRO SCH UNITS: 100 INJECTION, SOLUTION INTRAVENOUS; SUBCUTANEOUS at 12:00

## 2018-10-19 RX ADMIN — INSULIN LISPRO SCH UNITS: 100 INJECTION, SOLUTION INTRAVENOUS; SUBCUTANEOUS at 09:38

## 2018-10-19 RX ADMIN — INSULIN LISPRO SCH UNITS: 100 INJECTION, SOLUTION INTRAVENOUS; SUBCUTANEOUS at 13:17

## 2018-10-19 RX ADMIN — PIPERACILLIN SODIUM AND TAZOBACTAM SODIUM SCH MLS/HR: 3; .375 INJECTION, POWDER, LYOPHILIZED, FOR SOLUTION INTRAVENOUS at 05:55

## 2018-10-19 RX ADMIN — HYDROCODONE BITARTRATE AND ACETAMINOPHEN PRN TAB: 5; 325 TABLET ORAL at 21:44

## 2018-10-19 RX ADMIN — CELECOXIB SCH MG: 100 CAPSULE ORAL at 08:45

## 2018-10-19 RX ADMIN — LINEZOLID SCH MG: 600 TABLET, FILM COATED ORAL at 08:46

## 2018-10-19 RX ADMIN — LINEZOLID SCH MG: 600 TABLET, FILM COATED ORAL at 20:53

## 2018-10-19 RX ADMIN — DOCUSATE SODIUM SCH MG: 100 CAPSULE, LIQUID FILLED ORAL at 20:53

## 2018-10-19 RX ADMIN — Medication SCH CAP: at 20:53

## 2018-10-19 RX ADMIN — INSULIN LISPRO SCH UNITS: 100 INJECTION, SOLUTION INTRAVENOUS; SUBCUTANEOUS at 17:00

## 2018-10-19 RX ADMIN — INSULIN LISPRO SCH UNITS: 100 INJECTION, SOLUTION INTRAVENOUS; SUBCUTANEOUS at 09:37

## 2018-10-19 RX ADMIN — PIPERACILLIN SODIUM AND TAZOBACTAM SODIUM SCH MLS/HR: 3; .375 INJECTION, POWDER, LYOPHILIZED, FOR SOLUTION INTRAVENOUS at 13:11

## 2018-10-19 NOTE — PDOC
SURGICAL PROGRESS NOTE


Subjective


no new c/o


Vital Signs





Vital Signs








  Date Time  Temp Pulse Resp B/P (MAP) Pulse Ox O2 Delivery O2 Flow Rate FiO2


 


10/19/18 11:00 97.3 83 18 153/97 (115) 95 Room Air  





 97.3       


 


10/18/18 22:24       4.0 








I&O











Intake and Output 


 


 10/19/18





 07:00


 


Intake Total 910 ml


 


Output Total 0 ml


 


Balance 910 ml


 


 


 


Intake Oral 760 ml


 


IV Total 150 ml


 


Output Urine Total 0 ml


 


# Voids 1








PATIENT HAS A BILL:  No


Extremities:  Other (perirectal area with  minimal erythema, less indurated, 

minimal drainage)


Labs





Laboratory Tests








Test


 10/17/18


16:42 10/17/18


21:12 10/17/18


21:37 10/18/18


08:26


 


Glucose (Fingerstick)


 154 mg/dL


(70-99) 41 mg/dL


(70-99) 82 mg/dL


(70-99) 265 mg/dL


(70-99)


 


Test


 10/18/18


12:13 10/18/18


16:59 10/18/18


20:38 10/19/18


03:10


 


Glucose (Fingerstick)


 151 mg/dL


(70-99) 76 mg/dL


(70-99) 354 mg/dL


(70-99) 





 


White Blood Count


 


 


 


 10.2 x10^3/uL


(4.0-11.0)


 


Red Blood Count


 


 


 


 3.92 x10^6/uL


(4.30-5.70)


 


Hemoglobin


 


 


 


 12.1 g/dL


(13.0-17.5)


 


Hematocrit


 


 


 


 34.7 %


(39.0-53.0)


 


Mean Corpuscular Volume    89 fL () 


 


Mean Corpuscular Hemoglobin    31 pg (25-35) 


 


Mean Corpuscular Hemoglobin


Concent 


 


 


 35 g/dL


(31-37)


 


Red Cell Distribution Width


 


 


 


 13.4 %


(11.5-14.5)


 


Platelet Count


 


 


 


 393 x10^3/uL


(140-400)


 


Neutrophils (%) (Auto)    56 % (31-73) 


 


Lymphocytes (%) (Auto)    29 % (24-48) 


 


Monocytes (%) (Auto)    10 % (0-9) 


 


Eosinophils (%) (Auto)    5 % (0-3) 


 


Basophils (%) (Auto)    1 % (0-3) 


 


Neutrophils # (Auto)


 


 


 


 5.8 x10^3uL


(1.8-7.7)


 


Lymphocytes # (Auto)


 


 


 


 2.9 x10^3/uL


(1.0-4.8)


 


Monocytes # (Auto)


 


 


 


 1.0 x10^3/uL


(0.0-1.1)


 


Eosinophils # (Auto)


 


 


 


 0.5 x10^3/uL


(0.0-0.7)


 


Basophils # (Auto)


 


 


 


 0.1 x10^3/uL


(0.0-0.2)


 


Sodium Level


 


 


 


 141 mmol/L


(136-145)


 


Potassium Level


 


 


 


 4.1 mmol/L


(3.5-5.1)


 


Chloride Level


 


 


 


 103 mmol/L


()


 


Carbon Dioxide Level


 


 


 


 28 mmol/L


(21-32)


 


Anion Gap    10 (6-14) 


 


Blood Urea Nitrogen


 


 


 


 19 mg/dL


(8-26)


 


Creatinine


 


 


 


 1.2 mg/dL


(0.7-1.3)


 


Estimated GFR


(Cockcroft-Gault) 


 


 


 65.8 





 


Glucose Level


 


 


 


 236 mg/dL


(70-99)


 


Calcium Level


 


 


 


 8.6 mg/dL


(8.5-10.1)


 


Test


 10/19/18


08:12 10/19/18


11:22 


 





 


Glucose (Fingerstick)


 188 mg/dL


(70-99) 137 mg/dL


(70-99) 


 











Laboratory Tests








Test


 10/18/18


16:59 10/18/18


20:38 10/19/18


03:10 10/19/18


08:12


 


Glucose (Fingerstick)


 76 mg/dL


(70-99) 354 mg/dL


(70-99) 


 188 mg/dL


(70-99)


 


White Blood Count


 


 


 10.2 x10^3/uL


(4.0-11.0) 





 


Red Blood Count


 


 


 3.92 x10^6/uL


(4.30-5.70) 





 


Hemoglobin


 


 


 12.1 g/dL


(13.0-17.5) 





 


Hematocrit


 


 


 34.7 %


(39.0-53.0) 





 


Mean Corpuscular Volume   89 fL ()  


 


Mean Corpuscular Hemoglobin   31 pg (25-35)  


 


Mean Corpuscular Hemoglobin


Concent 


 


 35 g/dL


(31-37) 





 


Red Cell Distribution Width


 


 


 13.4 %


(11.5-14.5) 





 


Platelet Count


 


 


 393 x10^3/uL


(140-400) 





 


Neutrophils (%) (Auto)   56 % (31-73)  


 


Lymphocytes (%) (Auto)   29 % (24-48)  


 


Monocytes (%) (Auto)   10 % (0-9)  


 


Eosinophils (%) (Auto)   5 % (0-3)  


 


Basophils (%) (Auto)   1 % (0-3)  


 


Neutrophils # (Auto)


 


 


 5.8 x10^3uL


(1.8-7.7) 





 


Lymphocytes # (Auto)


 


 


 2.9 x10^3/uL


(1.0-4.8) 





 


Monocytes # (Auto)


 


 


 1.0 x10^3/uL


(0.0-1.1) 





 


Eosinophils # (Auto)


 


 


 0.5 x10^3/uL


(0.0-0.7) 





 


Basophils # (Auto)


 


 


 0.1 x10^3/uL


(0.0-0.2) 





 


Sodium Level


 


 


 141 mmol/L


(136-145) 





 


Potassium Level


 


 


 4.1 mmol/L


(3.5-5.1) 





 


Chloride Level


 


 


 103 mmol/L


() 





 


Carbon Dioxide Level


 


 


 28 mmol/L


(21-32) 





 


Anion Gap   10 (6-14)  


 


Blood Urea Nitrogen


 


 


 19 mg/dL


(8-26) 





 


Creatinine


 


 


 1.2 mg/dL


(0.7-1.3) 





 


Estimated GFR


(Cockcroft-Gault) 


 


 65.8 


 





 


Glucose Level


 


 


 236 mg/dL


(70-99) 





 


Calcium Level


 


 


 8.6 mg/dL


(8.5-10.1) 





 


Test


 10/19/18


11:22 


 


 





 


Glucose (Fingerstick)


 137 mg/dL


(70-99) 


 


 











Problem List


Problems


Medical Problems:


(1) Cellulitis


Status: Acute  








Assessment/Plan


perirectal absces s/p I and D 





home with Penrose drain


f/u in the office next week





d/w patient











BRANDEE CRUZ MD Oct 19, 2018 15:08

## 2018-10-19 NOTE — PDOC
Infectious Disease Note


Subjective


Subjective


Fairly comfortable, less pain 


No F/C/s/N/V/D/SOA/Rash





ROS


ROS


per HPI otherwise neg





Vital Sign


Vital Signs





Vital Signs








  Date Time  Temp Pulse Resp B/P (MAP) Pulse Ox O2 Delivery O2 Flow Rate FiO2


 


10/19/18 09:48   20  96 Room Air  


 


10/19/18 07:00 97.0 81  157/86 (109)    





 97.0       


 


10/18/18 22:24       4.0 











Physical Exam


PHYSICAL EXAM


GENERAL: Lying on right side, alert, relaxed appearance 


HEENT:  Oral cavity, pharynx clear.


NECK:  Supple with good range of motion.


LUNGS:  Clear to auscultation bilaterally.


HEART:  S1, S2.


ABDOMEN:  Soft, nontender, nondistended with positive bowel sounds.


EXTREMITIES:  Without clubbing, cyanosis or gross edema.


SKIN:  Warm to touch without signs of generalized rash. Lisa-rectal Penrose 


CNS: Alert, responds appropriately





Labs


Lab





Laboratory Tests








Test


 10/18/18


12:13 10/18/18


16:59 10/18/18


20:38 10/19/18


03:10


 


Glucose (Fingerstick)


 151 mg/dL


(70-99) 76 mg/dL


(70-99) 354 mg/dL


(70-99) 





 


White Blood Count


 


 


 


 10.2 x10^3/uL


(4.0-11.0)


 


Red Blood Count


 


 


 


 3.92 x10^6/uL


(4.30-5.70)


 


Hemoglobin


 


 


 


 12.1 g/dL


(13.0-17.5)


 


Hematocrit


 


 


 


 34.7 %


(39.0-53.0)


 


Mean Corpuscular Volume    89 fL () 


 


Mean Corpuscular Hemoglobin    31 pg (25-35) 


 


Mean Corpuscular Hemoglobin


Concent 


 


 


 35 g/dL


(31-37)


 


Red Cell Distribution Width


 


 


 


 13.4 %


(11.5-14.5)


 


Platelet Count


 


 


 


 393 x10^3/uL


(140-400)


 


Neutrophils (%) (Auto)    56 % (31-73) 


 


Lymphocytes (%) (Auto)    29 % (24-48) 


 


Monocytes (%) (Auto)    10 % (0-9) 


 


Eosinophils (%) (Auto)    5 % (0-3) 


 


Basophils (%) (Auto)    1 % (0-3) 


 


Neutrophils # (Auto)


 


 


 


 5.8 x10^3uL


(1.8-7.7)


 


Lymphocytes # (Auto)


 


 


 


 2.9 x10^3/uL


(1.0-4.8)


 


Monocytes # (Auto)


 


 


 


 1.0 x10^3/uL


(0.0-1.1)


 


Eosinophils # (Auto)


 


 


 


 0.5 x10^3/uL


(0.0-0.7)


 


Basophils # (Auto)


 


 


 


 0.1 x10^3/uL


(0.0-0.2)


 


Sodium Level


 


 


 


 141 mmol/L


(136-145)


 


Potassium Level


 


 


 


 4.1 mmol/L


(3.5-5.1)


 


Chloride Level


 


 


 


 103 mmol/L


()


 


Carbon Dioxide Level


 


 


 


 28 mmol/L


(21-32)


 


Anion Gap    10 (6-14) 


 


Blood Urea Nitrogen


 


 


 


 19 mg/dL


(8-26)


 


Creatinine


 


 


 


 1.2 mg/dL


(0.7-1.3)


 


Estimated GFR


(Cockcroft-Gault) 


 


 


 65.8 





 


Glucose Level


 


 


 


 236 mg/dL


(70-99)


 


Calcium Level


 


 


 


 8.6 mg/dL


(8.5-10.1)


 


Test


 10/19/18


08:12 


 


 





 


Glucose (Fingerstick)


 188 mg/dL


(70-99) 


 


 











Micro


10/14/18 Blood Culture - Final, Complete


           NO GROWTH AFTER 5 DAYS








  AEROBIC RES 1  Preliminary  


        Staphylococcus aureus





Objective


Assessment


Leukocytosis now s/p Dexamethasone 10/16 - improved 


Perirectal infection - s/p I and D 10/16 - Staph aureus so far


Transaminitis  improved


Fever - resolved


DM





Plan


Plan of Care


Cont Zyvox po and Zosyn 


f/u cultures


Local wound care


Attending Co-Sign


The patient was seen and interviewed as well as examined at the bedside. The 

chart was reviewed. The case was discussed. Agree with the plan of care.











DORIS ROCHA Oct 19, 2018 12:06


RUT LEE MD Oct 19, 2018 13:36

## 2018-10-19 NOTE — PDOC
PROGRESS NOTES


Chief Complaint


Chief Complaint


 


Left perineal cellulitis/abscess s/p i and d  10/17


Sepsis  


Diabetes 1, insulin req.  with unknown hemoglobin A1c -  


rectal pain, cannot void


HTN





plan:


fu with id, sx


has wound penrose drain , keep to home


on zyvox, zosyn, fu wound cx + staph aureus


pain control,add tramadol prn


cont lantus 18u qhs, decrease humolog to 15u tid , ssi


add dvt ppx








History of Present Illness


History of Present Illness


Pt seen and examined


VSS








i and d  10./17, pain is better, cx + staph aureus


wbc down to 10


hyperglycemia





Vitals


Vitals





Vital Signs








  Date Time  Temp Pulse Resp B/P (MAP) Pulse Ox O2 Delivery O2 Flow Rate FiO2


 


10/19/18 09:48   20  96 Room Air  


 


10/19/18 07:00 97.0 81  157/86 (109)    





 97.0       


 


10/18/18 22:24       4.0 











Physical Exam


Physical Exam


CONSTITUTIONAL:  He is a very pleasant gentleman.  He is cooperative.  He is in


no acute distress.


HEENT:  Pupils equal and reactive with normal conjunctivae.  Oral cavity,


pharynx is clear.


NECK:  Supple with good range of motion.


LUNGS:  Clear to auscultation bilaterally.


HEART:  S1, S2.


ABDOMEN:  Soft, nontender, nondistended with positive bowel sounds.


EXTREMITIES:  Without clubbing, cyanosis or gross edema.


SKIN:  Warm to touch without signs of generalized rash. 


perirectal area.  penrose drain. 


NEUROLOGIC:  He is nonfocal and appropriate.


PSYCHIATRIC:  Affect is pleasant.


General:  Alert, Oriented X3, Cooperative, No acute distress


Heart:  Regular rate


Abdomen:  Soft


Extremities:  No clubbing, No cyanosis, No edema, Normal pulses, No tenderness/

swelling


Skin:  Other (perirectal/perineal area less erythematous, less indurated)





Labs


LABS





Laboratory Tests








Test


 10/18/18


12:13 10/18/18


16:59 10/18/18


20:38 10/19/18


03:10


 


Glucose (Fingerstick)


 151 mg/dL


(70-99) 76 mg/dL


(70-99) 354 mg/dL


(70-99) 





 


White Blood Count


 


 


 


 10.2 x10^3/uL


(4.0-11.0)


 


Red Blood Count


 


 


 


 3.92 x10^6/uL


(4.30-5.70)


 


Hemoglobin


 


 


 


 12.1 g/dL


(13.0-17.5)


 


Hematocrit


 


 


 


 34.7 %


(39.0-53.0)


 


Mean Corpuscular Volume    89 fL () 


 


Mean Corpuscular Hemoglobin    31 pg (25-35) 


 


Mean Corpuscular Hemoglobin


Concent 


 


 


 35 g/dL


(31-37)


 


Red Cell Distribution Width


 


 


 


 13.4 %


(11.5-14.5)


 


Platelet Count


 


 


 


 393 x10^3/uL


(140-400)


 


Neutrophils (%) (Auto)    56 % (31-73) 


 


Lymphocytes (%) (Auto)    29 % (24-48) 


 


Monocytes (%) (Auto)    10 % (0-9) 


 


Eosinophils (%) (Auto)    5 % (0-3) 


 


Basophils (%) (Auto)    1 % (0-3) 


 


Neutrophils # (Auto)


 


 


 


 5.8 x10^3uL


(1.8-7.7)


 


Lymphocytes # (Auto)


 


 


 


 2.9 x10^3/uL


(1.0-4.8)


 


Monocytes # (Auto)


 


 


 


 1.0 x10^3/uL


(0.0-1.1)


 


Eosinophils # (Auto)


 


 


 


 0.5 x10^3/uL


(0.0-0.7)


 


Basophils # (Auto)


 


 


 


 0.1 x10^3/uL


(0.0-0.2)


 


Sodium Level


 


 


 


 141 mmol/L


(136-145)


 


Potassium Level


 


 


 


 4.1 mmol/L


(3.5-5.1)


 


Chloride Level


 


 


 


 103 mmol/L


()


 


Carbon Dioxide Level


 


 


 


 28 mmol/L


(21-32)


 


Anion Gap    10 (6-14) 


 


Blood Urea Nitrogen


 


 


 


 19 mg/dL


(8-26)


 


Creatinine


 


 


 


 1.2 mg/dL


(0.7-1.3)


 


Estimated GFR


(Cockcroft-Gault) 


 


 


 65.8 





 


Glucose Level


 


 


 


 236 mg/dL


(70-99)


 


Calcium Level


 


 


 


 8.6 mg/dL


(8.5-10.1)


 


Test


 10/19/18


08:12 


 


 





 


Glucose (Fingerstick)


 188 mg/dL


(70-99) 


 


 














Assessment and Plan


Assessmemt and Plan


Problems


Medical Problems:


(1) Cellulitis


Status: Acute  











Comment


Review of Relevant


I have reviewed the following items itz (where applicable) has been applied.


Labs





Laboratory Tests








Test


 10/17/18


11:45 10/17/18


16:42 10/17/18


21:12 10/17/18


21:37


 


Glucose (Fingerstick)


 288 mg/dL


(70-99) 154 mg/dL


(70-99) 41 mg/dL


(70-99) 82 mg/dL


(70-99)


 


Test


 10/18/18


08:26 10/18/18


12:13 10/18/18


16:59 10/18/18


20:38


 


Glucose (Fingerstick)


 265 mg/dL


(70-99) 151 mg/dL


(70-99) 76 mg/dL


(70-99) 354 mg/dL


(70-99)


 


Test


 10/19/18


03:10 10/19/18


08:12 


 





 


White Blood Count


 10.2 x10^3/uL


(4.0-11.0) 


 


 





 


Red Blood Count


 3.92 x10^6/uL


(4.30-5.70) 


 


 





 


Hemoglobin


 12.1 g/dL


(13.0-17.5) 


 


 





 


Hematocrit


 34.7 %


(39.0-53.0) 


 


 





 


Mean Corpuscular Volume 89 fL ()    


 


Mean Corpuscular Hemoglobin 31 pg (25-35)    


 


Mean Corpuscular Hemoglobin


Concent 35 g/dL


(31-37) 


 


 





 


Red Cell Distribution Width


 13.4 %


(11.5-14.5) 


 


 





 


Platelet Count


 393 x10^3/uL


(140-400) 


 


 





 


Neutrophils (%) (Auto) 56 % (31-73)    


 


Lymphocytes (%) (Auto) 29 % (24-48)    


 


Monocytes (%) (Auto) 10 % (0-9)    


 


Eosinophils (%) (Auto) 5 % (0-3)    


 


Basophils (%) (Auto) 1 % (0-3)    


 


Neutrophils # (Auto)


 5.8 x10^3uL


(1.8-7.7) 


 


 





 


Lymphocytes # (Auto)


 2.9 x10^3/uL


(1.0-4.8) 


 


 





 


Monocytes # (Auto)


 1.0 x10^3/uL


(0.0-1.1) 


 


 





 


Eosinophils # (Auto)


 0.5 x10^3/uL


(0.0-0.7) 


 


 





 


Basophils # (Auto)


 0.1 x10^3/uL


(0.0-0.2) 


 


 





 


Sodium Level


 141 mmol/L


(136-145) 


 


 





 


Potassium Level


 4.1 mmol/L


(3.5-5.1) 


 


 





 


Chloride Level


 103 mmol/L


() 


 


 





 


Carbon Dioxide Level


 28 mmol/L


(21-32) 


 


 





 


Anion Gap 10 (6-14)    


 


Blood Urea Nitrogen


 19 mg/dL


(8-26) 


 


 





 


Creatinine


 1.2 mg/dL


(0.7-1.3) 


 


 





 


Estimated GFR


(Cockcroft-Gault) 65.8 


 


 


 





 


Glucose Level


 236 mg/dL


(70-99) 


 


 





 


Calcium Level


 8.6 mg/dL


(8.5-10.1) 


 


 





 


Glucose (Fingerstick)


 


 188 mg/dL


(70-99) 


 











Laboratory Tests








Test


 10/18/18


12:13 10/18/18


16:59 10/18/18


20:38 10/19/18


03:10


 


Glucose (Fingerstick)


 151 mg/dL


(70-99) 76 mg/dL


(70-99) 354 mg/dL


(70-99) 





 


White Blood Count


 


 


 


 10.2 x10^3/uL


(4.0-11.0)


 


Red Blood Count


 


 


 


 3.92 x10^6/uL


(4.30-5.70)


 


Hemoglobin


 


 


 


 12.1 g/dL


(13.0-17.5)


 


Hematocrit


 


 


 


 34.7 %


(39.0-53.0)


 


Mean Corpuscular Volume    89 fL () 


 


Mean Corpuscular Hemoglobin    31 pg (25-35) 


 


Mean Corpuscular Hemoglobin


Concent 


 


 


 35 g/dL


(31-37)


 


Red Cell Distribution Width


 


 


 


 13.4 %


(11.5-14.5)


 


Platelet Count


 


 


 


 393 x10^3/uL


(140-400)


 


Neutrophils (%) (Auto)    56 % (31-73) 


 


Lymphocytes (%) (Auto)    29 % (24-48) 


 


Monocytes (%) (Auto)    10 % (0-9) 


 


Eosinophils (%) (Auto)    5 % (0-3) 


 


Basophils (%) (Auto)    1 % (0-3) 


 


Neutrophils # (Auto)


 


 


 


 5.8 x10^3uL


(1.8-7.7)


 


Lymphocytes # (Auto)


 


 


 


 2.9 x10^3/uL


(1.0-4.8)


 


Monocytes # (Auto)


 


 


 


 1.0 x10^3/uL


(0.0-1.1)


 


Eosinophils # (Auto)


 


 


 


 0.5 x10^3/uL


(0.0-0.7)


 


Basophils # (Auto)


 


 


 


 0.1 x10^3/uL


(0.0-0.2)


 


Sodium Level


 


 


 


 141 mmol/L


(136-145)


 


Potassium Level


 


 


 


 4.1 mmol/L


(3.5-5.1)


 


Chloride Level


 


 


 


 103 mmol/L


()


 


Carbon Dioxide Level


 


 


 


 28 mmol/L


(21-32)


 


Anion Gap    10 (6-14) 


 


Blood Urea Nitrogen


 


 


 


 19 mg/dL


(8-26)


 


Creatinine


 


 


 


 1.2 mg/dL


(0.7-1.3)


 


Estimated GFR


(Cockcroft-Gault) 


 


 


 65.8 





 


Glucose Level


 


 


 


 236 mg/dL


(70-99)


 


Calcium Level


 


 


 


 8.6 mg/dL


(8.5-10.1)


 


Test


 10/19/18


08:12 


 


 





 


Glucose (Fingerstick)


 188 mg/dL


(70-99) 


 


 











Microbiology


10/14/18 Blood Culture - Preliminary, Resulted


           NO GROWTH AFTER 4 DAYS


10/16/18 Anaerobic/Aerobic Culture, Resulted


           Pending


10/16/18 Anaerobic Culture Result 1 (ERIC), Resulted


           Pending


10/16/18 Aerobic Culture - Preliminary, Resulted


           


10/16/18 Aerobic Culture Result 1 (ERIC) - Preliminary, Resulted


           


10/16/18 Gram Stain - Final, Resulted


           


10/16/18 Gram Stain Result 1 (ERIC) - Final, Resulted


           


10/16/18 Gram Stain Result 2 (ERIC) - Final, Resulted


Medications





Current Medications


Fentanyl Citrate (Fentanyl 2ml Vial) 25 mcg PRN Q15MIN  PRN IV PAIN GREATER 

THAN 3/10 Last administered on 10/14/18at 14:14;  Start 10/14/18 at 10:30;  

Stop 10/15/18 at 10:29;  Status DC


Sodium Chloride 1,000 ml @  1,000 mls/hr Q1H IV  Last administered on 10/14/

18at 10:39;  Start 10/14/18 at 10:22;  Stop 10/14/18 at 11:21;  Status DC


Ondansetron HCl (Zofran) 4 mg 1X  ONCE IV  Last administered on 10/14/18at 10:45

;  Start 10/14/18 at 11:00;  Stop 10/14/18 at 11:01;  Status DC


Vancomycin HCl 250 ml @  250 mls/hr 1X  ONCE IV ;  Start 10/14/18 at 10:30;  

Stop 10/14/18 at 10:31;  Status DC


Piperacillin Sod/ Tazobactam Sod 3.375 gm/Sodium Chloride 50 ml @  100 mls/hr 

1X  ONCE IV  Last administered on 10/14/18at 11:04;  Start 10/14/18 at 11:00;  

Stop 10/14/18 at 11:29;  Status DC


Vancomycin HCl 1.75 gm/Sodium Chloride 500 ml @  250 mls/hr 1X  ONCE IV  Last 

administered on 10/14/18at 11:05;  Start 10/14/18 at 11:30;  Stop 10/14/18 at 13

:29;  Status DC


Iohexol (Omnipaque 300 Mg/ml) 75 ml 1X  ONCE IV ;  Start 10/14/18 at 11:45;  

Stop 10/14/18 at 11:48;  Status DC


Info (CONTRAST GIVEN -- Rx MONITORING) 1 each PRN DAILY  PRN MC SEE COMMENTS;  

Start 10/14/18 at 12:00;  Stop 10/16/18 at 11:59;  Status DC


Ondansetron HCl (Zofran) 4 mg PRN Q8HRS  PRN IV NAUSEA/VOMITING;  Start 10/14/

18 at 12:45;  Stop 10/14/18 at 12:55;  Status DC


Morphine Sulfate (Morphine Sulfate) 4 mg PRN Q2HR  PRN IV PAIN Last 

administered on 10/14/18at 17:32;  Start 10/14/18 at 12:45;  Stop 10/15/18 at 12

:44;  Status DC


Sodium Chloride 1,000 ml @  75 mls/hr [id number] IV  Last administered on 10/15/

18at 05:52;  Start 10/14/18 at 12:32;  Stop 10/15/18 at 12:31;  Status DC


Ondansetron HCl (Zofran) 4 mg PRN Q6HRS  PRN IV NAUSEA/VOMITING Last 

administered on 10/16/18at 07:08;  Start 10/14/18 at 13:00


Celecoxib (CeleBREX) 100 mg BID PO  Last administered on 10/19/18at 08:45;  

Start 10/14/18 at 21:00


Acetaminophen/ Hydrocodone Bitart (Lortab 5/325) 1 tab PRN Q4HRS  PRN PO 

MODERATE-SEVERE PAIN Last administered on 10/19/18at 09:48;  Start 10/14/18 at 

13:00


Diphenhydramine HCl (Benadryl) 25 mg PRN QHS  PRN PO INSOMNIA Last administered 

on 10/15/18at 21:54;  Start 10/14/18 at 13:00


Insulin Human Lispro (HumaLOG) 0-9 UNITS TIDWMEALS SQ  Last administered on 10/

19/18at 09:37;  Start 10/14/18 at 17:00


Dextrose (Dextrose 50%-Water Syringe) 12.5 gm PRN Q15MIN  PRN IV SEE COMMENTS;  

Start 10/14/18 at 13:00


Insulin Glargine (Lantus) 18 units QHS SQ  Last administered on 10/14/18at 20:57

;  Start 10/14/18 at 21:00;  Stop 10/15/18 at 10:13;  Status DC


Insulin Human Lispro (HumaLOG) 15 units TIDWMEALS SQ  Last administered on 10/15

/18at 09:19;  Start 10/14/18 at 17:00;  Stop 10/15/18 at 10:13;  Status DC


Acetaminophen/ Hydrocodone Bitart (Lortab 5/325) 1 tab PRN Q6HRS  PRN PO PAIN;  

Start 10/14/18 at 13:00;  Status UNV


Clindamycin Phosphate 50 ml @  100 mls/hr Q8HRS IV  Last administered on 10/15/

18at 05:52;  Start 10/14/18 at 22:00;  Stop 10/15/18 at 11:28;  Status DC


Acetaminophen (Tylenol) 650 mg PRN Q6HRS  PRN PO FEVER Last administered on 10/

15/18at 17:57;  Start 10/14/18 at 21:15


Docusate Sodium (Colace) 100 mg BID PO  Last administered on 10/19/18at 08:46;  

Start 10/14/18 at 22:00


Polyethylene Glycol (miraLAX PACKET) 17 gm DAILY PO  Last administered on 10/15/

18at 09:09;  Start 10/14/18 at 22:00


Magnesium Hydroxide (Milk Of Magnesia) 2,400 mg PRN DAILY  PRN PO CONSTIPATION 

1ST CHOICE;  Start 10/14/18 at 21:15


Influenza Virus Vaccine (Afluria Trivalent [id number] Syringe) 0.5 ml ONCE ONCE 

VAX IM ;  Start 10/15/18 at 09:00;  Stop 10/15/18 at 09:01;  Status DC


Insulin Glargine (Lantus) 22 units QHS SQ ;  Start 10/15/18 at 21:00;  Stop 10/

15/18 at 21:00;  Status DC


Insulin Human Lispro (HumaLOG) 20 units TIDWMEALS SQ  Last administered on 10/17

/18at 08:44;  Start 10/15/18 at 12:00;  Stop 10/17/18 at 09:39;  Status DC


Insulin Glargine (Lantus) 18 units QHS SQ  Last administered on 10/18/18at 21:13

;  Start 10/15/18 at 21:00


Piperacillin Sod/ Tazobactam Sod 3.375 gm/Sodium Chloride 50 ml @  100 mls/hr 

Q6HRS IV  Last administered on 10/19/18at 05:55;  Start 10/15/18 at 12:00


Linezolid (Zyvox) 600 mg BID PO  Last administered on 10/19/18at 08:46;  Start 

10/15/18 at 12:00


Lactobacillus Rhamnosus (Culturelle) 1 cap BID PO  Last administered on 10/19/

18at 08:46;  Start 10/15/18 at 12:00


Ondansetron HCl (Zofran) 4 mg PRN Q6HRS  PRN IV NAUSEA/VOMITING;  Start 10/16/

18 at 07:00;  Stop 10/17/18 at 06:59;  Status DC


Fentanyl Citrate (Fentanyl 2ml Vial) 25 mcg PRN Q5MIN  PRN IV MILD PAIN;  Start 

10/16/18 at 07:00;  Stop 10/17/18 at 06:59;  Status DC


Fentanyl Citrate (Fentanyl 2ml Vial) 50 mcg PRN Q5MIN  PRN IV MODERATE TO 

SEVERE PAIN Last administered on 10/16/18at 16:03;  Start 10/16/18 at 07:00;  

Stop 10/17/18 at 06:59;  Status DC


Morphine Sulfate (Morphine Sulfate) 1 mg PRN Q10MIN  PRN IV SEVERE PAIN;  Start 

10/16/18 at 07:00;  Stop 10/17/18 at 06:59;  Status DC


Ringer's Solution 1,000 ml @  30 mls/hr Q24H IV  Last administered on 10/16/

18at 12:18;  Start 10/16/18 at 07:00;  Stop 10/16/18 at 18:59;  Status DC


Lidocaine HCl (Xylocaine-Mpf 1% 2ml Vial) 2 ml PRN 1X  PRN ID PRIOR TO IV START

;  Start 10/16/18 at 07:00;  Stop 10/17/18 at 06:59;  Status DC


Hydromorphone HCl (Dilaudid) 0.5 mg PRN Q10MIN  PRN IV SEV PAIN, Second choice;

  Start 10/16/18 at 07:00;  Stop 10/17/18 at 06:59;  Status DC


Prochlorperazine Edisylate (Compazine) 5 mg PACU PRN  PRN IV NAUSEA, MRX1;  

Start 10/16/18 at 07:00;  Stop 10/17/18 at 06:59;  Status DC


Morphine Sulfate (Morphine Sulfate) 2 mg PRN Q2HR  PRN IV MODERATE TO SEVERE 

PAIN Last administered on 10/16/18at 21:13;  Start 10/16/18 at 10:45


Propofol 20 ml @ As Directed STK-MED ONCE IV ;  Start 10/16/18 at 11:55;  Stop 

10/16/18 at 11:56;  Status DC


Dexamethasone Sodium Phosphate (Decadron) 20 mg STK-MED ONCE .ROUTE ;  Start 10/

16/18 at 11:55;  Stop 10/16/18 at 11:56;  Status DC


Lidocaine HCl (Lidocaine Pf 2% Vial) 5 ml STK-MED ONCE .ROUTE ;  Start 10/16/18 

at 11:55;  Stop 10/16/18 at 11:56;  Status DC


Ondansetron HCl (Zofran) 4 mg STK-MED ONCE .ROUTE ;  Start 10/16/18 at 11:55;  

Stop 10/16/18 at 11:56;  Status DC


Fentanyl Citrate (Fentanyl 2ml Vial) 100 mcg STK-MED ONCE .ROUTE ;  Start 10/16/

18 at 11:55;  Stop 10/16/18 at 11:56;  Status DC


Midazolam HCl (Versed) 2 mg STK-MED ONCE .ROUTE ;  Start 10/16/18 at 12:00;  

Stop 10/16/18 at 12:01;  Status DC


Bupivacaine HCl/ Epinephrine Bitart (Sensorcain-Mpf Epi 0.5%-1:[id number]) 30 ml STK

-MED ONCE .ROUTE  Last administered on 10/16/18at 13:49;  Start 10/16/18 at 12:

26;  Stop 10/16/18 at 13:27;  Status DC


Esmolol HCl (Brevibloc) 100 mg STK-MED ONCE IV ;  Start 10/16/18 at 13:41;  

Stop 10/16/18 at 13:42;  Status DC


Cellulose (Surgicel Hemostat 2x3) 1 each STK-MED ONCE .ROUTE  Last administered 

on 10/16/18at 14:03;  Start 10/16/18 at 12:48;  Stop 10/16/18 at 13:48;  Status 

DC


Sevoflurane (Ultane) 30 ml STK-MED ONCE IH ;  Start 10/16/18 at 13:51;  Stop 10/

16/18 at 13:52;  Status DC


Epinephrine HCl (Adrenalin) 30 mg STK-MED ONCE .ROUTE  Last administered on 10/

16/18at 14:04;  Start 10/16/18 at 12:57;  Stop 10/16/18 at 13:57;  Status DC


Epinephrine HCl (EPINEPHrine SYRINGE) 1 mg STK-MED ONCE .ROUTE ;  Start 10/16/

18 at 13:59;  Stop 10/16/18 at 14:00;  Status DC


Albuterol Sulfate (Ventolin Neb Soln) 2.5 mg 1X  ONCE NEB  Last administered on 

10/16/18at 14:32;  Start 10/16/18 at 14:30;  Stop 10/16/18 at 14:31;  Status DC


Furosemide (Lasix) 10 mg 1X  ONCE IVP  Last administered on 10/16/18at 14:43;  

Start 10/16/18 at 14:45;  Stop 10/16/18 at 14:46;  Status DC


Furosemide (Lasix) 10 mg 1X  ONCE IVP ;  Start 10/16/18 at 15:30;  Stop 10/16/

18 at 15:33;  Status DC


Insulin Human Lispro (HumaLOG VIAL) 3 unit 1X  ONCE SQ  Last administered on 10/

16/18at 15:34;  Start 10/16/18 at 15:30;  Stop 10/16/18 at 15:33;  Status DC


Insulin Human Lispro (HumaLOG) 25 units TIDWMEALS SQ  Last administered on 10/18

/18at 08:44;  Start 10/17/18 at 12:00;  Stop 10/18/18 at 08:46;  Status DC


Insulin Human Lispro (HumaLOG) 20 units TIDWMEALS SQ  Last administered on 10/18

/18at 12:26;  Start 10/18/18 at 12:00;  Stop 10/19/18 at 07:51;  Status DC


Insulin Glargine (Lantus) 15 units 1X  ONCE SQ ;  Start 10/18/18 at 09:30;  

Stop 10/18/18 at 09:31;  Status DC


Enoxaparin Sodium (Lovenox 40mg Syringe) 40 mg Q24H SQ ;  Start 10/18/18 at 14:

00


Tramadol HCl (Ultram) 50 mg PRN Q6HRS  PRN PO MILD TO MODERATE PAIN;  Start 10/

18/18 at 13:00


Acetaminophen (Tylenol) 650 mg PRN Q6HRS  PRN PO MILD PAIN / TEMP;  Start 10/18/

18 at 13:00;  Status UNV


Insulin Human Lispro (HumaLOG) 15 units 1X  ONCE SQ  Last administered on 10/18/

18at 21:26;  Start 10/18/18 at 21:30;  Stop 10/18/18 at 21:31;  Status DC


Insulin Human Lispro (HumaLOG) 15 units TIDWMEALS SQ  Last administered on 10/19

/18at 09:38;  Start 10/19/18 at 08:00





Active Scripts


Active


Norco 5-325 Tablet (Acetaminophen/Hydrocodone Bitart) 1 Each Tablet 1 Tab PO 

PRN Q6HRS PRN


Bactrim Ds Tablet (Sulfamethoxazole/Trimethoprim) 1 Each Tablet 1 Tab PO BID


Reported


Humalog (Insulin Lispro) 100 Unit/1 Ml Cartridge 100 Unit SQ 


Lantus Solostar (Insulin Glargine,Hum.rec.anlog) 100 Unit/1 Ml Insuln.pen 10 

Unit SQ QHS


Vitals/I & O





Vital Sign - Last 24 Hours








 10/18/18 10/18/18 10/18/18 10/18/18





 11:00 12:20 15:00 17:20


 


Temp 97.7  97.7 





 97.7  97.7 


 


Pulse 77  84 


 


Resp 18  18 


 


B/P (MAP) 170/93 (118)  139/81 (100) 


 


Pulse Ox 96  96 


 


O2 Delivery Room Air Room Air Room Air Room Air


 


    





    





 10/18/18 10/18/18 10/18/18 10/18/18





 19:00 20:00 21:24 22:24


 


Temp 98.0   





 98.0   


 


Pulse 82   


 


Resp 20   


 


B/P (MAP) 146/85 (105)   


 


Pulse Ox 97  97 97


 


O2 Delivery Room Air Room Air Room Air Room Air


 


O2 Flow Rate  4.0 4.0 4.0


 


    





    





 10/18/18 10/19/18 10/19/18 10/19/18





 23:04 03:52 07:00 09:48


 


Temp 98.5 97.6 97.0 





 98.5 97.6 97.0 


 


Pulse 87 78 81 


 


Resp 20 20 18 20


 


B/P (MAP) 142/87 (105) 146/74 (98) 157/86 (109) 


 


Pulse Ox 97 93 96 96


 


O2 Delivery Room Air Room Air Room Air Room Air














Intake and Output   


 


 10/18/18 10/18/18 10/19/18





 15:00 23:00 07:00


 


Intake Total 270 ml 590 ml 50 ml


 


Output Total   0 ml


 


Balance 270 ml 590 ml 50 ml

















KELLI MARTINEZ MD Oct 19, 2018 10:46

## 2018-10-20 VITALS — DIASTOLIC BLOOD PRESSURE: 89 MMHG | SYSTOLIC BLOOD PRESSURE: 146 MMHG

## 2018-10-20 VITALS — DIASTOLIC BLOOD PRESSURE: 78 MMHG | SYSTOLIC BLOOD PRESSURE: 136 MMHG

## 2018-10-20 VITALS — DIASTOLIC BLOOD PRESSURE: 60 MMHG | SYSTOLIC BLOOD PRESSURE: 125 MMHG

## 2018-10-20 VITALS — DIASTOLIC BLOOD PRESSURE: 82 MMHG | SYSTOLIC BLOOD PRESSURE: 135 MMHG

## 2018-10-20 VITALS — SYSTOLIC BLOOD PRESSURE: 154 MMHG | DIASTOLIC BLOOD PRESSURE: 98 MMHG

## 2018-10-20 VITALS — DIASTOLIC BLOOD PRESSURE: 90 MMHG | SYSTOLIC BLOOD PRESSURE: 151 MMHG

## 2018-10-20 LAB
ANION GAP SERPL CALC-SCNC: 7 MMOL/L (ref 6–14)
BASOPHILS # BLD AUTO: 0.1 X10^3/UL (ref 0–0.2)
BASOPHILS NFR BLD: 1 % (ref 0–3)
BUN SERPL-MCNC: 18 MG/DL (ref 8–26)
CALCIUM SERPL-MCNC: 9 MG/DL (ref 8.5–10.1)
CHLORIDE SERPL-SCNC: 101 MMOL/L (ref 98–107)
CO2 SERPL-SCNC: 28 MMOL/L (ref 21–32)
CREAT SERPL-MCNC: 1.3 MG/DL (ref 0.7–1.3)
EOSINOPHIL NFR BLD: 0.5 X10^3/UL (ref 0–0.7)
EOSINOPHIL NFR BLD: 6 % (ref 0–3)
ERYTHROCYTE [DISTWIDTH] IN BLOOD BY AUTOMATED COUNT: 13.6 % (ref 11.5–14.5)
GFR SERPLBLD BASED ON 1.73 SQ M-ARVRAT: 60 ML/MIN
GLUCOSE SERPL-MCNC: 340 MG/DL (ref 70–99)
HCT VFR BLD CALC: 37.5 % (ref 39–53)
HGB BLD-MCNC: 13 G/DL (ref 13–17.5)
LYMPHOCYTES # BLD: 2.4 X10^3/UL (ref 1–4.8)
LYMPHOCYTES NFR BLD AUTO: 28 % (ref 24–48)
MCH RBC QN AUTO: 31 PG (ref 25–35)
MCHC RBC AUTO-ENTMCNC: 35 G/DL (ref 31–37)
MCV RBC AUTO: 88 FL (ref 79–100)
MONO #: 0.7 X10^3/UL (ref 0–1.1)
MONOCYTES NFR BLD: 8 % (ref 0–9)
NEUT #: 5.1 X10^3UL (ref 1.8–7.7)
NEUTROPHILS NFR BLD AUTO: 58 % (ref 31–73)
PLATELET # BLD AUTO: 455 X10^3/UL (ref 140–400)
POTASSIUM SERPL-SCNC: 5 MMOL/L (ref 3.5–5.1)
RBC # BLD AUTO: 4.26 X10^6/UL (ref 4.3–5.7)
SODIUM SERPL-SCNC: 136 MMOL/L (ref 136–145)
WBC # BLD AUTO: 8.8 X10^3/UL (ref 4–11)

## 2018-10-20 RX ADMIN — DOCUSATE SODIUM SCH MG: 100 CAPSULE, LIQUID FILLED ORAL at 21:45

## 2018-10-20 RX ADMIN — PIPERACILLIN SODIUM AND TAZOBACTAM SODIUM SCH MLS/HR: 3; .375 INJECTION, POWDER, LYOPHILIZED, FOR SOLUTION INTRAVENOUS at 12:01

## 2018-10-20 RX ADMIN — PIPERACILLIN SODIUM AND TAZOBACTAM SODIUM SCH MLS/HR: 3; .375 INJECTION, POWDER, LYOPHILIZED, FOR SOLUTION INTRAVENOUS at 01:38

## 2018-10-20 RX ADMIN — Medication SCH CAP: at 21:45

## 2018-10-20 RX ADMIN — HYDROCODONE BITARTRATE AND ACETAMINOPHEN PRN TAB: 5; 325 TABLET ORAL at 18:22

## 2018-10-20 RX ADMIN — CELECOXIB SCH MG: 100 CAPSULE ORAL at 08:11

## 2018-10-20 RX ADMIN — LISINOPRIL SCH MG: 20 TABLET ORAL at 09:29

## 2018-10-20 RX ADMIN — PIPERACILLIN SODIUM AND TAZOBACTAM SODIUM SCH MLS/HR: 3; .375 INJECTION, POWDER, LYOPHILIZED, FOR SOLUTION INTRAVENOUS at 23:53

## 2018-10-20 RX ADMIN — INSULIN LISPRO SCH UNITS: 100 INJECTION, SOLUTION INTRAVENOUS; SUBCUTANEOUS at 08:15

## 2018-10-20 RX ADMIN — DOCUSATE SODIUM SCH MG: 100 CAPSULE, LIQUID FILLED ORAL at 08:10

## 2018-10-20 RX ADMIN — LINEZOLID SCH MG: 600 TABLET, FILM COATED ORAL at 08:11

## 2018-10-20 RX ADMIN — POLYETHYLENE GLYCOL 3350 SCH GM: 17 POWDER, FOR SOLUTION ORAL at 08:13

## 2018-10-20 RX ADMIN — Medication SCH CAP: at 08:11

## 2018-10-20 RX ADMIN — INSULIN GLARGINE SCH UNITS: 100 INJECTION, SOLUTION SUBCUTANEOUS at 21:50

## 2018-10-20 RX ADMIN — INSULIN LISPRO SCH UNITS: 100 INJECTION, SOLUTION INTRAVENOUS; SUBCUTANEOUS at 12:06

## 2018-10-20 RX ADMIN — HYDROCODONE BITARTRATE AND ACETAMINOPHEN PRN TAB: 5; 325 TABLET ORAL at 22:24

## 2018-10-20 RX ADMIN — PIPERACILLIN SODIUM AND TAZOBACTAM SODIUM SCH MLS/HR: 3; .375 INJECTION, POWDER, LYOPHILIZED, FOR SOLUTION INTRAVENOUS at 06:18

## 2018-10-20 RX ADMIN — INSULIN LISPRO SCH UNITS: 100 INJECTION, SOLUTION INTRAVENOUS; SUBCUTANEOUS at 12:05

## 2018-10-20 RX ADMIN — INSULIN LISPRO SCH UNITS: 100 INJECTION, SOLUTION INTRAVENOUS; SUBCUTANEOUS at 16:45

## 2018-10-20 RX ADMIN — INSULIN LISPRO SCH UNITS: 100 INJECTION, SOLUTION INTRAVENOUS; SUBCUTANEOUS at 16:46

## 2018-10-20 RX ADMIN — PIPERACILLIN SODIUM AND TAZOBACTAM SODIUM SCH MLS/HR: 3; .375 INJECTION, POWDER, LYOPHILIZED, FOR SOLUTION INTRAVENOUS at 18:18

## 2018-10-20 RX ADMIN — ENOXAPARIN SODIUM SCH MG: 40 INJECTION SUBCUTANEOUS at 14:00

## 2018-10-20 RX ADMIN — HYDROCODONE BITARTRATE AND ACETAMINOPHEN PRN TAB: 5; 325 TABLET ORAL at 06:22

## 2018-10-20 RX ADMIN — CELECOXIB SCH MG: 100 CAPSULE ORAL at 21:45

## 2018-10-20 NOTE — PDOC
Infectious Disease Note


Subjective


Subjective


Hoping to go home soon


Pain controlled


No F/C/S/N/V/D/SOA/Rash





ROS


ROS


per HPI





Vital Sign


Vital Signs





Vital Signs








  Date Time  Temp Pulse Resp B/P (MAP) Pulse Ox O2 Delivery O2 Flow Rate FiO2


 


10/20/18 11:00 97.7 87 18 146/89 (108) 98 Room Air  





 97.7       











Physical Exam


PHYSICAL EXAM


GENERAL: Lying on right side, alert, relaxed appearance 


HEENT:  Oral cavity, pharynx clear.


NECK:  Supple with good range of motion.


LUNGS:  Clear to auscultation bilaterally.


HEART:  S1, S2.


ABDOMEN:  Soft, nontender, nondistended with positive bowel sounds.


EXTREMITIES:  Without clubbing, cyanosis or gross edema.


SKIN:  Warm to touch without signs of generalized rash. Lisa-rectal Penrose in 

place, + drainage. no area redness or induration


CNS: Alert, responds appropriately





Labs


Lab





Laboratory Tests








Test


 10/19/18


16:45 10/19/18


20:56 10/19/18


21:29 10/20/18


04:40


 


Glucose (Fingerstick)


 74 mg/dL


(70-99) 52 mg/dL


(70-99) 144 mg/dL


(70-99) 





 


White Blood Count


 


 


 


 8.8 x10^3/uL


(4.0-11.0)


 


Red Blood Count


 


 


 


 4.26 x10^6/uL


(4.30-5.70)


 


Hemoglobin


 


 


 


 13.0 g/dL


(13.0-17.5)


 


Hematocrit


 


 


 


 37.5 %


(39.0-53.0)


 


Mean Corpuscular Volume    88 fL () 


 


Mean Corpuscular Hemoglobin    31 pg (25-35) 


 


Mean Corpuscular Hemoglobin


Concent 


 


 


 35 g/dL


(31-37)


 


Red Cell Distribution Width


 


 


 


 13.6 %


(11.5-14.5)


 


Platelet Count


 


 


 


 455 x10^3/uL


(140-400)


 


Neutrophils (%) (Auto)    58 % (31-73) 


 


Lymphocytes (%) (Auto)    28 % (24-48) 


 


Monocytes (%) (Auto)    8 % (0-9) 


 


Eosinophils (%) (Auto)    6 % (0-3) 


 


Basophils (%) (Auto)    1 % (0-3) 


 


Neutrophils # (Auto)


 


 


 


 5.1 x10^3uL


(1.8-7.7)


 


Lymphocytes # (Auto)


 


 


 


 2.4 x10^3/uL


(1.0-4.8)


 


Monocytes # (Auto)


 


 


 


 0.7 x10^3/uL


(0.0-1.1)


 


Eosinophils # (Auto)


 


 


 


 0.5 x10^3/uL


(0.0-0.7)


 


Basophils # (Auto)


 


 


 


 0.1 x10^3/uL


(0.0-0.2)


 


Sodium Level


 


 


 


 136 mmol/L


(136-145)


 


Potassium Level


 


 


 


 5.0 mmol/L


(3.5-5.1)


 


Chloride Level


 


 


 


 101 mmol/L


()


 


Carbon Dioxide Level


 


 


 


 28 mmol/L


(21-32)


 


Anion Gap    7 (6-14) 


 


Blood Urea Nitrogen


 


 


 


 18 mg/dL


(8-26)


 


Creatinine


 


 


 


 1.3 mg/dL


(0.7-1.3)


 


Estimated GFR


(Cockcroft-Gault) 


 


 


 60.0 





 


Glucose Level


 


 


 


 340 mg/dL


(70-99)


 


Calcium Level


 


 


 


 9.0 mg/dL


(8.5-10.1)


 


Test


 10/20/18


08:08 10/20/18


11:35 


 





 


Glucose (Fingerstick)


 310 mg/dL


(70-99) 200 mg/dL


(70-99) 


 











Micro


10/14/18 Blood Culture - Final, Complete


           NO GROWTH AFTER 5 DAYS








 AEROBIC RES 1  Final  


        Staphylococcus aureus





       


I


                    MICS are expressed in micrograms per mL


           Antibiotic                 RSLT#1    RSLT#2    


        Ciprofloxacin                  S<=0.5


        Clindamycin                    S<=0.25


        Erythromycin                   R>=8


        Gentamicin                     S<=0.5


        Levofloxacin                   S<=0.12


        Linezolid                      S =2


        Moxifloxacin                   S<=0.25


        Oxacillin                      S =1


        Penicillin                     R>=0.5


        Quinupristin/Dalfopristin      S<=0.25


        Rifampin                       S<=0.5


        Tetracycline                   S<=1


        Trimethoprim/Sulfa             S<=10


        Vancomycin                     S<=0.5





Objective


Assessment


Leukocytosis now s/p Dexamethasone 10/16 - improved 


Perirectal infection - s/p I and D 10/16 - MSSA


Transaminitis  improved


Fever - resolved


DM





Plan


Plan of Care


Zosyn, switch to po soon


d/c Zyvox 


f/u cultures


Local wound care


Patient seen, examined, I agree with above.  Assessment and plan was formulated 

with ARNP.











SUBLDORIS CASTILLO Oct 20, 2018 13:38


LEXA LEE MD Oct 20, 2018 15:38

## 2018-10-20 NOTE — PDOC
PROGRESS NOTES


Chief Complaint


Chief Complaint


 


Left perineal cellulitis/abscess s/p i and d  10/17


Sepsis  


Diabetes 1, insulin req.  with unknown hemoglobin A1c -  


rectal pain, cannot void


HTN





plan:


fu with id, sx


has wound penrose drain , keep to home


on zyvox, zosyn, fu wound cx + staph aureus


pain control,add tramadol prn


cont lantus 18u qhs, decrease humolog to 12u tid , ssi


add dvt ppx


change diet to ADA diet


add lisinopril 40mg daily. pt not happy to take more pills, but ok to do it 

after i explained








History of Present Illness


History of Present Illness


Pt seen and examined


VSS


pt wants go home





i and d  10./17, pain is better, cx + staph aureus


wbc down to 8


hyperglycemia , but hypoglycemia in pm. on full liquid diet not sure why, dc to 

ADA diet





Vitals


Vitals





Vital Signs








  Date Time  Temp Pulse Resp B/P (MAP) Pulse Ox O2 Delivery O2 Flow Rate FiO2


 


10/20/18 11:00 97.7 87 18 146/89 (108) 98 Room Air  





 97.7       











Physical Exam


Physical Exam


GENERAL: Lying on right side, alert, relaxed appearance 


HEENT:  Oral cavity, pharynx clear.


NECK:  Supple with good range of motion.


LUNGS:  Clear to auscultation bilaterally.


HEART:  S1, S2.


ABDOMEN:  Soft, nontender, nondistended with positive bowel sounds.


EXTREMITIES:  Without clubbing, cyanosis or gross edema.


SKIN:  Warm to touch without signs of generalized rash. Lisa-rectal Penrose 


CNS: Alert, responds appropriately


General:  Alert, Oriented X3, Cooperative, No acute distress


Heart:  Regular rate


Abdomen:  Soft


Extremities:  Other (perirectal area with  minimal erythema, less indurated, 

minimal drainage)


Skin:  Other (perirectal/perineal area less erythematous, less indurated)





Labs


LABS





Laboratory Tests








Test


 10/19/18


16:45 10/19/18


20:56 10/19/18


21:29 10/20/18


04:40


 


Glucose (Fingerstick)


 74 mg/dL


(70-99) 52 mg/dL


(70-99) 144 mg/dL


(70-99) 





 


White Blood Count


 


 


 


 8.8 x10^3/uL


(4.0-11.0)


 


Red Blood Count


 


 


 


 4.26 x10^6/uL


(4.30-5.70)


 


Hemoglobin


 


 


 


 13.0 g/dL


(13.0-17.5)


 


Hematocrit


 


 


 


 37.5 %


(39.0-53.0)


 


Mean Corpuscular Volume    88 fL () 


 


Mean Corpuscular Hemoglobin    31 pg (25-35) 


 


Mean Corpuscular Hemoglobin


Concent 


 


 


 35 g/dL


(31-37)


 


Red Cell Distribution Width


 


 


 


 13.6 %


(11.5-14.5)


 


Platelet Count


 


 


 


 455 x10^3/uL


(140-400)


 


Neutrophils (%) (Auto)    58 % (31-73) 


 


Lymphocytes (%) (Auto)    28 % (24-48) 


 


Monocytes (%) (Auto)    8 % (0-9) 


 


Eosinophils (%) (Auto)    6 % (0-3) 


 


Basophils (%) (Auto)    1 % (0-3) 


 


Neutrophils # (Auto)


 


 


 


 5.1 x10^3uL


(1.8-7.7)


 


Lymphocytes # (Auto)


 


 


 


 2.4 x10^3/uL


(1.0-4.8)


 


Monocytes # (Auto)


 


 


 


 0.7 x10^3/uL


(0.0-1.1)


 


Eosinophils # (Auto)


 


 


 


 0.5 x10^3/uL


(0.0-0.7)


 


Basophils # (Auto)


 


 


 


 0.1 x10^3/uL


(0.0-0.2)


 


Sodium Level


 


 


 


 136 mmol/L


(136-145)


 


Potassium Level


 


 


 


 5.0 mmol/L


(3.5-5.1)


 


Chloride Level


 


 


 


 101 mmol/L


()


 


Carbon Dioxide Level


 


 


 


 28 mmol/L


(21-32)


 


Anion Gap    7 (6-14) 


 


Blood Urea Nitrogen


 


 


 


 18 mg/dL


(8-26)


 


Creatinine


 


 


 


 1.3 mg/dL


(0.7-1.3)


 


Estimated GFR


(Cockcroft-Gault) 


 


 


 60.0 





 


Glucose Level


 


 


 


 340 mg/dL


(70-99)


 


Calcium Level


 


 


 


 9.0 mg/dL


(8.5-10.1)


 


Test


 10/20/18


08:08 10/20/18


11:35 


 





 


Glucose (Fingerstick)


 310 mg/dL


(70-99) 200 mg/dL


(70-99) 


 














Assessment and Plan


Assessmemt and Plan


Problems


Medical Problems:


(1) Cellulitis


Status: Acute  











Comment


Review of Relevant


I have reviewed the following items itz (where applicable) has been applied.


Labs





Laboratory Tests








Test


 10/18/18


16:59 10/18/18


20:38 10/19/18


03:10 10/19/18


08:12


 


Glucose (Fingerstick)


 76 mg/dL


(70-99) 354 mg/dL


(70-99) 


 188 mg/dL


(70-99)


 


White Blood Count


 


 


 10.2 x10^3/uL


(4.0-11.0) 





 


Red Blood Count


 


 


 3.92 x10^6/uL


(4.30-5.70) 





 


Hemoglobin


 


 


 12.1 g/dL


(13.0-17.5) 





 


Hematocrit


 


 


 34.7 %


(39.0-53.0) 





 


Mean Corpuscular Volume   89 fL ()  


 


Mean Corpuscular Hemoglobin   31 pg (25-35)  


 


Mean Corpuscular Hemoglobin


Concent 


 


 35 g/dL


(31-37) 





 


Red Cell Distribution Width


 


 


 13.4 %


(11.5-14.5) 





 


Platelet Count


 


 


 393 x10^3/uL


(140-400) 





 


Neutrophils (%) (Auto)   56 % (31-73)  


 


Lymphocytes (%) (Auto)   29 % (24-48)  


 


Monocytes (%) (Auto)   10 % (0-9)  


 


Eosinophils (%) (Auto)   5 % (0-3)  


 


Basophils (%) (Auto)   1 % (0-3)  


 


Neutrophils # (Auto)


 


 


 5.8 x10^3uL


(1.8-7.7) 





 


Lymphocytes # (Auto)


 


 


 2.9 x10^3/uL


(1.0-4.8) 





 


Monocytes # (Auto)


 


 


 1.0 x10^3/uL


(0.0-1.1) 





 


Eosinophils # (Auto)


 


 


 0.5 x10^3/uL


(0.0-0.7) 





 


Basophils # (Auto)


 


 


 0.1 x10^3/uL


(0.0-0.2) 





 


Sodium Level


 


 


 141 mmol/L


(136-145) 





 


Potassium Level


 


 


 4.1 mmol/L


(3.5-5.1) 





 


Chloride Level


 


 


 103 mmol/L


() 





 


Carbon Dioxide Level


 


 


 28 mmol/L


(21-32) 





 


Anion Gap   10 (6-14)  


 


Blood Urea Nitrogen


 


 


 19 mg/dL


(8-26) 





 


Creatinine


 


 


 1.2 mg/dL


(0.7-1.3) 





 


Estimated GFR


(Cockcroft-Gault) 


 


 65.8 


 





 


Glucose Level


 


 


 236 mg/dL


(70-99) 





 


Calcium Level


 


 


 8.6 mg/dL


(8.5-10.1) 





 


Test


 10/19/18


11:22 10/19/18


16:45 10/19/18


20:56 10/19/18


21:29


 


Glucose (Fingerstick)


 137 mg/dL


(70-99) 74 mg/dL


(70-99) 52 mg/dL


(70-99) 144 mg/dL


(70-99)


 


Test


 10/20/18


04:40 10/20/18


08:08 10/20/18


11:35 





 


White Blood Count


 8.8 x10^3/uL


(4.0-11.0) 


 


 





 


Red Blood Count


 4.26 x10^6/uL


(4.30-5.70) 


 


 





 


Hemoglobin


 13.0 g/dL


(13.0-17.5) 


 


 





 


Hematocrit


 37.5 %


(39.0-53.0) 


 


 





 


Mean Corpuscular Volume 88 fL ()    


 


Mean Corpuscular Hemoglobin 31 pg (25-35)    


 


Mean Corpuscular Hemoglobin


Concent 35 g/dL


(31-37) 


 


 





 


Red Cell Distribution Width


 13.6 %


(11.5-14.5) 


 


 





 


Platelet Count


 455 x10^3/uL


(140-400) 


 


 





 


Neutrophils (%) (Auto) 58 % (31-73)    


 


Lymphocytes (%) (Auto) 28 % (24-48)    


 


Monocytes (%) (Auto) 8 % (0-9)    


 


Eosinophils (%) (Auto) 6 % (0-3)    


 


Basophils (%) (Auto) 1 % (0-3)    


 


Neutrophils # (Auto)


 5.1 x10^3uL


(1.8-7.7) 


 


 





 


Lymphocytes # (Auto)


 2.4 x10^3/uL


(1.0-4.8) 


 


 





 


Monocytes # (Auto)


 0.7 x10^3/uL


(0.0-1.1) 


 


 





 


Eosinophils # (Auto)


 0.5 x10^3/uL


(0.0-0.7) 


 


 





 


Basophils # (Auto)


 0.1 x10^3/uL


(0.0-0.2) 


 


 





 


Sodium Level


 136 mmol/L


(136-145) 


 


 





 


Potassium Level


 5.0 mmol/L


(3.5-5.1) 


 


 





 


Chloride Level


 101 mmol/L


() 


 


 





 


Carbon Dioxide Level


 28 mmol/L


(21-32) 


 


 





 


Anion Gap 7 (6-14)    


 


Blood Urea Nitrogen


 18 mg/dL


(8-26) 


 


 





 


Creatinine


 1.3 mg/dL


(0.7-1.3) 


 


 





 


Estimated GFR


(Cockcroft-Gault) 60.0 


 


 


 





 


Glucose Level


 340 mg/dL


(70-99) 


 


 





 


Calcium Level


 9.0 mg/dL


(8.5-10.1) 


 


 





 


Glucose (Fingerstick)


 


 310 mg/dL


(70-99) 200 mg/dL


(70-99) 











Laboratory Tests








Test


 10/19/18


16:45 10/19/18


20:56 10/19/18


21:29 10/20/18


04:40


 


Glucose (Fingerstick)


 74 mg/dL


(70-99) 52 mg/dL


(70-99) 144 mg/dL


(70-99) 





 


White Blood Count


 


 


 


 8.8 x10^3/uL


(4.0-11.0)


 


Red Blood Count


 


 


 


 4.26 x10^6/uL


(4.30-5.70)


 


Hemoglobin


 


 


 


 13.0 g/dL


(13.0-17.5)


 


Hematocrit


 


 


 


 37.5 %


(39.0-53.0)


 


Mean Corpuscular Volume    88 fL () 


 


Mean Corpuscular Hemoglobin    31 pg (25-35) 


 


Mean Corpuscular Hemoglobin


Concent 


 


 


 35 g/dL


(31-37)


 


Red Cell Distribution Width


 


 


 


 13.6 %


(11.5-14.5)


 


Platelet Count


 


 


 


 455 x10^3/uL


(140-400)


 


Neutrophils (%) (Auto)    58 % (31-73) 


 


Lymphocytes (%) (Auto)    28 % (24-48) 


 


Monocytes (%) (Auto)    8 % (0-9) 


 


Eosinophils (%) (Auto)    6 % (0-3) 


 


Basophils (%) (Auto)    1 % (0-3) 


 


Neutrophils # (Auto)


 


 


 


 5.1 x10^3uL


(1.8-7.7)


 


Lymphocytes # (Auto)


 


 


 


 2.4 x10^3/uL


(1.0-4.8)


 


Monocytes # (Auto)


 


 


 


 0.7 x10^3/uL


(0.0-1.1)


 


Eosinophils # (Auto)


 


 


 


 0.5 x10^3/uL


(0.0-0.7)


 


Basophils # (Auto)


 


 


 


 0.1 x10^3/uL


(0.0-0.2)


 


Sodium Level


 


 


 


 136 mmol/L


(136-145)


 


Potassium Level


 


 


 


 5.0 mmol/L


(3.5-5.1)


 


Chloride Level


 


 


 


 101 mmol/L


()


 


Carbon Dioxide Level


 


 


 


 28 mmol/L


(21-32)


 


Anion Gap    7 (6-14) 


 


Blood Urea Nitrogen


 


 


 


 18 mg/dL


(8-26)


 


Creatinine


 


 


 


 1.3 mg/dL


(0.7-1.3)


 


Estimated GFR


(Cockcroft-Gault) 


 


 


 60.0 





 


Glucose Level


 


 


 


 340 mg/dL


(70-99)


 


Calcium Level


 


 


 


 9.0 mg/dL


(8.5-10.1)


 


Test


 10/20/18


08:08 10/20/18


11:35 


 





 


Glucose (Fingerstick)


 310 mg/dL


(70-99) 200 mg/dL


(70-99) 


 











Microbiology


10/14/18 Blood Culture - Final, Complete


           NO GROWTH AFTER 5 DAYS


10/16/18 Anaerobic/Aerobic Culture, Resulted


           Pending


10/16/18 Anaerobic Culture Result 1 (ERIC), Resulted


           Pending


10/16/18 Aerobic Culture - Final, Resulted


           


10/16/18 Aerobic Culture Result 1 (ERIC) - Final, Resulted


           


10/16/18 Antimicrobic Susceptibility - Final, Resulted


           


10/16/18 Gram Stain - Final, Resulted


           


10/16/18 Gram Stain Result 1 (ERIC) - Final, Resulted


           


10/16/18 Gram Stain Result 2 (ERIC) - Final, Resulted


Medications





Current Medications


Fentanyl Citrate (Fentanyl 2ml Vial) 25 mcg PRN Q15MIN  PRN IV PAIN GREATER 

THAN 3/10 Last administered on 10/14/18at 14:14;  Start 10/14/18 at 10:30;  

Stop 10/15/18 at 10:29;  Status DC


Sodium Chloride 1,000 ml @  1,000 mls/hr Q1H IV  Last administered on 10/14/

18at 10:39;  Start 10/14/18 at 10:22;  Stop 10/14/18 at 11:21;  Status DC


Ondansetron HCl (Zofran) 4 mg 1X  ONCE IV  Last administered on 10/14/18at 10:45

;  Start 10/14/18 at 11:00;  Stop 10/14/18 at 11:01;  Status DC


Vancomycin HCl 250 ml @  250 mls/hr 1X  ONCE IV ;  Start 10/14/18 at 10:30;  

Stop 10/14/18 at 10:31;  Status DC


Piperacillin Sod/ Tazobactam Sod 3.375 gm/Sodium Chloride 50 ml @  100 mls/hr 

1X  ONCE IV  Last administered on 10/14/18at 11:04;  Start 10/14/18 at 11:00;  

Stop 10/14/18 at 11:29;  Status DC


Vancomycin HCl 1.75 gm/Sodium Chloride 500 ml @  250 mls/hr 1X  ONCE IV  Last 

administered on 10/14/18at 11:05;  Start 10/14/18 at 11:30;  Stop 10/14/18 at 13

:29;  Status DC


Iohexol (Omnipaque 300 Mg/ml) 75 ml 1X  ONCE IV ;  Start 10/14/18 at 11:45;  

Stop 10/14/18 at 11:48;  Status DC


Info (CONTRAST GIVEN -- Rx MONITORING) 1 each PRN DAILY  PRN MC SEE COMMENTS;  

Start 10/14/18 at 12:00;  Stop 10/16/18 at 11:59;  Status DC


Ondansetron HCl (Zofran) 4 mg PRN Q8HRS  PRN IV NAUSEA/VOMITING;  Start 10/14/

18 at 12:45;  Stop 10/14/18 at 12:55;  Status DC


Morphine Sulfate (Morphine Sulfate) 4 mg PRN Q2HR  PRN IV PAIN Last 

administered on 10/14/18at 17:32;  Start 10/14/18 at 12:45;  Stop 10/15/18 at 12

:44;  Status DC


Sodium Chloride 1,000 ml @  75 mls/hr [id number] IV  Last administered on 10/15/

18at 05:52;  Start 10/14/18 at 12:32;  Stop 10/15/18 at 12:31;  Status DC


Ondansetron HCl (Zofran) 4 mg PRN Q6HRS  PRN IV NAUSEA/VOMITING Last 

administered on 10/16/18at 07:08;  Start 10/14/18 at 13:00


Celecoxib (CeleBREX) 100 mg BID PO  Last administered on 10/20/18at 08:11;  

Start 10/14/18 at 21:00


Acetaminophen/ Hydrocodone Bitart (Lortab 5/325) 1 tab PRN Q4HRS  PRN PO 

MODERATE-SEVERE PAIN Last administered on 10/20/18at 06:22;  Start 10/14/18 at 

13:00


Diphenhydramine HCl (Benadryl) 25 mg PRN QHS  PRN PO INSOMNIA Last administered 

on 10/15/18at 21:54;  Start 10/14/18 at 13:00


Insulin Human Lispro (HumaLOG) 0-9 UNITS TIDWMEALS SQ  Last administered on 10/

20/18at 12:05;  Start 10/14/18 at 17:00


Dextrose (Dextrose 50%-Water Syringe) 12.5 gm PRN Q15MIN  PRN IV SEE COMMENTS;  

Start 10/14/18 at 13:00


Insulin Glargine (Lantus) 18 units QHS SQ  Last administered on 10/14/18at 20:57

;  Start 10/14/18 at 21:00;  Stop 10/15/18 at 10:13;  Status DC


Insulin Human Lispro (HumaLOG) 15 units TIDWMEALS SQ  Last administered on 10/15

/18at 09:19;  Start 10/14/18 at 17:00;  Stop 10/15/18 at 10:13;  Status DC


Acetaminophen/ Hydrocodone Bitart (Lortab 5/325) 1 tab PRN Q6HRS  PRN PO PAIN;  

Start 10/14/18 at 13:00;  Status UNV


Clindamycin Phosphate 50 ml @  100 mls/hr Q8HRS IV  Last administered on 10/15/

18at 05:52;  Start 10/14/18 at 22:00;  Stop 10/15/18 at 11:28;  Status DC


Acetaminophen (Tylenol) 650 mg PRN Q6HRS  PRN PO FEVER Last administered on 10/

15/18at 17:57;  Start 10/14/18 at 21:15


Docusate Sodium (Colace) 100 mg BID PO  Last administered on 10/20/18at 08:10;  

Start 10/14/18 at 22:00


Polyethylene Glycol (miraLAX PACKET) 17 gm DAILY PO  Last administered on 10/15/

18at 09:09;  Start 10/14/18 at 22:00


Magnesium Hydroxide (Milk Of Magnesia) 2,400 mg PRN DAILY  PRN PO CONSTIPATION 

1ST CHOICE;  Start 10/14/18 at 21:15


Influenza Virus Vaccine (Afluria Trivalent [id number] Syringe) 0.5 ml ONCE ONCE 

VAX IM ;  Start 10/15/18 at 09:00;  Stop 10/15/18 at 09:01;  Status DC


Insulin Glargine (Lantus) 22 units QHS SQ ;  Start 10/15/18 at 21:00;  Stop 10/

15/18 at 21:00;  Status DC


Insulin Human Lispro (HumaLOG) 20 units TIDWMEALS SQ  Last administered on 10/17

/18at 08:44;  Start 10/15/18 at 12:00;  Stop 10/17/18 at 09:39;  Status DC


Insulin Glargine (Lantus) 18 units QHS SQ  Last administered on 10/19/18at 21:42

;  Start 10/15/18 at 21:00


Piperacillin Sod/ Tazobactam Sod 3.375 gm/Sodium Chloride 50 ml @  100 mls/hr 

Q6HRS IV  Last administered on 10/20/18at 12:01;  Start 10/15/18 at 12:00


Linezolid (Zyvox) 600 mg BID PO  Last administered on 10/20/18at 08:11;  Start 

10/15/18 at 12:00


Lactobacillus Rhamnosus (Culturelle) 1 cap BID PO  Last administered on 10/20/

18at 08:11;  Start 10/15/18 at 12:00


Ondansetron HCl (Zofran) 4 mg PRN Q6HRS  PRN IV NAUSEA/VOMITING;  Start 10/16/

18 at 07:00;  Stop 10/17/18 at 06:59;  Status DC


Fentanyl Citrate (Fentanyl 2ml Vial) 25 mcg PRN Q5MIN  PRN IV MILD PAIN;  Start 

10/16/18 at 07:00;  Stop 10/17/18 at 06:59;  Status DC


Fentanyl Citrate (Fentanyl 2ml Vial) 50 mcg PRN Q5MIN  PRN IV MODERATE TO 

SEVERE PAIN Last administered on 10/16/18at 16:03;  Start 10/16/18 at 07:00;  

Stop 10/17/18 at 06:59;  Status DC


Morphine Sulfate (Morphine Sulfate) 1 mg PRN Q10MIN  PRN IV SEVERE PAIN;  Start 

10/16/18 at 07:00;  Stop 10/17/18 at 06:59;  Status DC


Ringer's Solution 1,000 ml @  30 mls/hr Q24H IV  Last administered on 10/16/

18at 12:18;  Start 10/16/18 at 07:00;  Stop 10/16/18 at 18:59;  Status DC


Lidocaine HCl (Xylocaine-Mpf 1% 2ml Vial) 2 ml PRN 1X  PRN ID PRIOR TO IV START

;  Start 10/16/18 at 07:00;  Stop 10/17/18 at 06:59;  Status DC


Hydromorphone HCl (Dilaudid) 0.5 mg PRN Q10MIN  PRN IV SEV PAIN, Second choice;

  Start 10/16/18 at 07:00;  Stop 10/17/18 at 06:59;  Status DC


Prochlorperazine Edisylate (Compazine) 5 mg PACU PRN  PRN IV NAUSEA, MRX1;  

Start 10/16/18 at 07:00;  Stop 10/17/18 at 06:59;  Status DC


Morphine Sulfate (Morphine Sulfate) 2 mg PRN Q2HR  PRN IV MODERATE TO SEVERE 

PAIN Last administered on 10/16/18at 21:13;  Start 10/16/18 at 10:45


Propofol 20 ml @ As Directed STK-MED ONCE IV ;  Start 10/16/18 at 11:55;  Stop 

10/16/18 at 11:56;  Status DC


Dexamethasone Sodium Phosphate (Decadron) 20 mg STK-MED ONCE .ROUTE ;  Start 10/

16/18 at 11:55;  Stop 10/16/18 at 11:56;  Status DC


Lidocaine HCl (Lidocaine Pf 2% Vial) 5 ml STK-MED ONCE .ROUTE ;  Start 10/16/18 

at 11:55;  Stop 10/16/18 at 11:56;  Status DC


Ondansetron HCl (Zofran) 4 mg STK-MED ONCE .ROUTE ;  Start 10/16/18 at 11:55;  

Stop 10/16/18 at 11:56;  Status DC


Fentanyl Citrate (Fentanyl 2ml Vial) 100 mcg STK-MED ONCE .ROUTE ;  Start 10/16/

18 at 11:55;  Stop 10/16/18 at 11:56;  Status DC


Midazolam HCl (Versed) 2 mg STK-MED ONCE .ROUTE ;  Start 10/16/18 at 12:00;  

Stop 10/16/18 at 12:01;  Status DC


Bupivacaine HCl/ Epinephrine Bitart (Sensorcain-Mpf Epi 0.5%-1:[id number]) 30 ml STK

-MED ONCE .ROUTE  Last administered on 10/16/18at 13:49;  Start 10/16/18 at 12:

26;  Stop 10/16/18 at 13:27;  Status DC


Esmolol HCl (Brevibloc) 100 mg STK-MED ONCE IV ;  Start 10/16/18 at 13:41;  

Stop 10/16/18 at 13:42;  Status DC


Cellulose (Surgicel Hemostat 2x3) 1 each STK-MED ONCE .ROUTE  Last administered 

on 10/16/18at 14:03;  Start 10/16/18 at 12:48;  Stop 10/16/18 at 13:48;  Status 

DC


Sevoflurane (Ultane) 30 ml STK-MED ONCE IH ;  Start 10/16/18 at 13:51;  Stop 10/

16/18 at 13:52;  Status DC


Epinephrine HCl (Adrenalin) 30 mg STK-MED ONCE .ROUTE  Last administered on 10/

16/18at 14:04;  Start 10/16/18 at 12:57;  Stop 10/16/18 at 13:57;  Status DC


Epinephrine HCl (EPINEPHrine SYRINGE) 1 mg STK-MED ONCE .ROUTE ;  Start 10/16/

18 at 13:59;  Stop 10/16/18 at 14:00;  Status DC


Albuterol Sulfate (Ventolin Neb Soln) 2.5 mg 1X  ONCE NEB  Last administered on 

10/16/18at 14:32;  Start 10/16/18 at 14:30;  Stop 10/16/18 at 14:31;  Status DC


Furosemide (Lasix) 10 mg 1X  ONCE IVP  Last administered on 10/16/18at 14:43;  

Start 10/16/18 at 14:45;  Stop 10/16/18 at 14:46;  Status DC


Furosemide (Lasix) 10 mg 1X  ONCE IVP ;  Start 10/16/18 at 15:30;  Stop 10/16/

18 at 15:33;  Status DC


Insulin Human Lispro (HumaLOG VIAL) 3 unit 1X  ONCE SQ  Last administered on 10/

16/18at 15:34;  Start 10/16/18 at 15:30;  Stop 10/16/18 at 15:33;  Status DC


Insulin Human Lispro (HumaLOG) 25 units TIDWMEALS SQ  Last administered on 10/18

/18at 08:44;  Start 10/17/18 at 12:00;  Stop 10/18/18 at 08:46;  Status DC


Insulin Human Lispro (HumaLOG) 20 units TIDWMEALS SQ  Last administered on 10/18

/18at 12:26;  Start 10/18/18 at 12:00;  Stop 10/19/18 at 07:51;  Status DC


Insulin Glargine (Lantus) 15 units 1X  ONCE SQ ;  Start 10/18/18 at 09:30;  

Stop 10/18/18 at 09:31;  Status DC


Enoxaparin Sodium (Lovenox 40mg Syringe) 40 mg Q24H SQ ;  Start 10/18/18 at 14:

00


Tramadol HCl (Ultram) 50 mg PRN Q6HRS  PRN PO MILD TO MODERATE PAIN;  Start 10/

18/18 at 13:00


Acetaminophen (Tylenol) 650 mg PRN Q6HRS  PRN PO MILD PAIN / TEMP;  Start 10/18/

18 at 13:00;  Status UNV


Insulin Human Lispro (HumaLOG) 15 units 1X  ONCE SQ  Last administered on 10/18/

18at 21:26;  Start 10/18/18 at 21:30;  Stop 10/18/18 at 21:31;  Status DC


Insulin Human Lispro (HumaLOG) 15 units TIDWMEALS SQ  Last administered on 10/19

/18at 17:47;  Start 10/19/18 at 08:00;  Stop 10/20/18 at 07:47;  Status DC


Insulin Human Lispro (HumaLOG) 12 units TIDWMEALS SQ  Last administered on 10/20

/18at 12:06;  Start 10/20/18 at 08:00


Lisinopril (Prinivil) 40 mg DAILY PO  Last administered on 10/20/18at 09:29;  

Start 10/20/18 at 10:00





Active Scripts


Active


Norco 5-325 Tablet (Acetaminophen/Hydrocodone Bitart) 1 Each Tablet 1 Tab PO 

PRN Q6HRS PRN


Bactrim Ds Tablet (Sulfamethoxazole/Trimethoprim) 1 Each Tablet 1 Tab PO BID


Reported


Humalog (Insulin Lispro) 100 Unit/1 Ml Cartridge 100 Unit SQ 


Lantus Solostar (Insulin Glargine,Hum.rec.anlog) 100 Unit/1 Ml Insuln.pen 10 

Unit SQ QHS


Vitals/I & O





Vital Sign - Last 24 Hours








 10/19/18 10/19/18 10/19/18 10/19/18





 15:00 15:32 16:32 19:00


 


Temp 97.7   98.6





 97.7   98.6


 


Pulse 88   88


 


Resp 18 18  20


 


B/P (MAP) 160/86 (110)   160/90 (113)


 


Pulse Ox 97 95 95 95


 


O2 Delivery Room Air Room Air  Room Air


 


    





    





 10/19/18 10/19/18 10/19/18 10/19/18





 20:50 21:44 22:56 23:02


 


Temp    98.4





    98.4


 


Pulse    89


 


Resp  20 20 20


 


B/P (MAP)    159/93 (115)


 


Pulse Ox    97


 


O2 Delivery Room Air Room Air  Room Air


 


    





    





 10/20/18 10/20/18 10/20/18 10/20/18





 03:03 06:22 07:00 08:10


 


Temp 97.6  97.5 





 97.6  97.5 


 


Pulse 85  83 


 


Resp 20 20 18 


 


B/P (MAP) 151/90 (110)  154/98 (116) 


 


Pulse Ox 96 9 95 


 


O2 Delivery Room Air Room Air Room Air Room Air


 


    





    





 10/20/18 10/20/18 10/20/18 





 08:13 09:29 11:00 


 


Temp   97.7 





   97.7 


 


Pulse  83 87 


 


Resp   18 


 


B/P (MAP)  154/98 146/89 (108) 


 


Pulse Ox   98 


 


O2 Delivery Room Air  Room Air 














Intake and Output   


 


 10/19/18 10/19/18 10/20/18





 15:00 23:00 07:00


 


Intake Total   50 ml


 


Balance   50 ml

















KELLI MARTINEZ MD Oct 20, 2018 13:08

## 2018-10-21 VITALS — DIASTOLIC BLOOD PRESSURE: 57 MMHG | SYSTOLIC BLOOD PRESSURE: 121 MMHG

## 2018-10-21 VITALS — SYSTOLIC BLOOD PRESSURE: 154 MMHG | DIASTOLIC BLOOD PRESSURE: 86 MMHG

## 2018-10-21 VITALS — DIASTOLIC BLOOD PRESSURE: 60 MMHG | SYSTOLIC BLOOD PRESSURE: 125 MMHG

## 2018-10-21 RX ADMIN — DOCUSATE SODIUM SCH MG: 100 CAPSULE, LIQUID FILLED ORAL at 07:54

## 2018-10-21 RX ADMIN — PIPERACILLIN SODIUM AND TAZOBACTAM SODIUM SCH MLS/HR: 3; .375 INJECTION, POWDER, LYOPHILIZED, FOR SOLUTION INTRAVENOUS at 11:58

## 2018-10-21 RX ADMIN — INSULIN LISPRO SCH UNITS: 100 INJECTION, SOLUTION INTRAVENOUS; SUBCUTANEOUS at 12:02

## 2018-10-21 RX ADMIN — PIPERACILLIN SODIUM AND TAZOBACTAM SODIUM SCH MLS/HR: 3; .375 INJECTION, POWDER, LYOPHILIZED, FOR SOLUTION INTRAVENOUS at 05:12

## 2018-10-21 RX ADMIN — LISINOPRIL SCH MG: 20 TABLET ORAL at 07:55

## 2018-10-21 RX ADMIN — CELECOXIB SCH MG: 100 CAPSULE ORAL at 07:54

## 2018-10-21 RX ADMIN — ENOXAPARIN SODIUM SCH MG: 40 INJECTION SUBCUTANEOUS at 14:00

## 2018-10-21 RX ADMIN — POLYETHYLENE GLYCOL 3350 SCH GM: 17 POWDER, FOR SOLUTION ORAL at 07:59

## 2018-10-21 RX ADMIN — INSULIN LISPRO SCH UNITS: 100 INJECTION, SOLUTION INTRAVENOUS; SUBCUTANEOUS at 07:59

## 2018-10-21 RX ADMIN — Medication SCH CAP: at 07:54

## 2018-10-21 RX ADMIN — INSULIN LISPRO SCH UNITS: 100 INJECTION, SOLUTION INTRAVENOUS; SUBCUTANEOUS at 07:58

## 2018-10-21 NOTE — PDOC
PROGRESS NOTES


Chief Complaint


Chief Complaint


 


Left perineal cellulitis/abscess s/p i and d  10/17


Sepsis  


Diabetes 1, insulin req.  with unknown hemoglobin A1c -  


rectal pain, cannot void


HTN





plan:


fu with id, sx


has wound penrose drain , keep to home


on zyvox, zosyn, fu wound cx + staph aureus


pain control,add tramadol prn


increase lantus 20u qhs, decrease humolog to 15u tid , ssi


add dvt ppx


change diet to ADA diet


add lisinopril 40mg daily 





talked to ID, if change to po abx today, can dc later.








History of Present Illness


History of Present Illness


Pt seen and examined


VSS


pt wants go home





i and d  10./17, pain is better, cx + staph aureus MSSA


wbc down to 8


hyperglycemia , but hypoglycemia in pm. on full liquid diet not sure why, dced 

to ADA diet





Vitals


Vitals





Vital Signs








  Date Time  Temp Pulse Resp B/P (MAP) Pulse Ox O2 Delivery O2 Flow Rate FiO2


 


10/21/18 11:00 97.7 86 18 154/86 (108) 94 Room Air  





 97.7       











Physical Exam


Physical Exam


GENERAL: Lying on right side, alert, relaxed appearance 


HEENT:  Oral cavity, pharynx clear.


NECK:  Supple with good range of motion.


LUNGS:  Clear to auscultation bilaterally.


HEART:  S1, S2.


ABDOMEN:  Soft, nontender, nondistended with positive bowel sounds.


EXTREMITIES:  Without clubbing, cyanosis or gross edema.


SKIN:  Warm to touch without signs of generalized rash. Lisa-rectal Penrose in 

place, + drainage. no area redness or induration


CNS: Alert, responds appropriately


General:  Alert, Oriented X3, Cooperative, No acute distress


Heart:  Regular rate


Abdomen:  Soft


Extremities:  Other (perirectal area with  minimal erythema, less indurated, 

minimal drainage)


Skin:  Other (perirectal/perineal area less erythematous, less indurated)





Labs


LABS





Laboratory Tests








Test


 10/20/18


16:44 10/20/18


17:04 10/20/18


21:06 10/21/18


07:28


 


Glucose (Fingerstick)


 45 mg/dL


(70-99) 104 mg/dL


(70-99) 437 mg/dL


(70-99) 390 mg/dL


(70-99)


 


Test


 10/21/18


11:07 


 


 





 


Glucose (Fingerstick)


 292 mg/dL


(70-99) 


 


 














Assessment and Plan


Assessmemt and Plan


Problems


Medical Problems:


(1) Cellulitis


Status: Acute  











Comment


Review of Relevant


I have reviewed the following items itz (where applicable) has been applied.


Labs





Laboratory Tests








Test


 10/19/18


16:45 10/19/18


20:56 10/19/18


21:29 10/20/18


04:40


 


Glucose (Fingerstick)


 74 mg/dL


(70-99) 52 mg/dL


(70-99) 144 mg/dL


(70-99) 





 


White Blood Count


 


 


 


 8.8 x10^3/uL


(4.0-11.0)


 


Red Blood Count


 


 


 


 4.26 x10^6/uL


(4.30-5.70)


 


Hemoglobin


 


 


 


 13.0 g/dL


(13.0-17.5)


 


Hematocrit


 


 


 


 37.5 %


(39.0-53.0)


 


Mean Corpuscular Volume    88 fL () 


 


Mean Corpuscular Hemoglobin    31 pg (25-35) 


 


Mean Corpuscular Hemoglobin


Concent 


 


 


 35 g/dL


(31-37)


 


Red Cell Distribution Width


 


 


 


 13.6 %


(11.5-14.5)


 


Platelet Count


 


 


 


 455 x10^3/uL


(140-400)


 


Neutrophils (%) (Auto)    58 % (31-73) 


 


Lymphocytes (%) (Auto)    28 % (24-48) 


 


Monocytes (%) (Auto)    8 % (0-9) 


 


Eosinophils (%) (Auto)    6 % (0-3) 


 


Basophils (%) (Auto)    1 % (0-3) 


 


Neutrophils # (Auto)


 


 


 


 5.1 x10^3uL


(1.8-7.7)


 


Lymphocytes # (Auto)


 


 


 


 2.4 x10^3/uL


(1.0-4.8)


 


Monocytes # (Auto)


 


 


 


 0.7 x10^3/uL


(0.0-1.1)


 


Eosinophils # (Auto)


 


 


 


 0.5 x10^3/uL


(0.0-0.7)


 


Basophils # (Auto)


 


 


 


 0.1 x10^3/uL


(0.0-0.2)


 


Sodium Level


 


 


 


 136 mmol/L


(136-145)


 


Potassium Level


 


 


 


 5.0 mmol/L


(3.5-5.1)


 


Chloride Level


 


 


 


 101 mmol/L


()


 


Carbon Dioxide Level


 


 


 


 28 mmol/L


(21-32)


 


Anion Gap    7 (6-14) 


 


Blood Urea Nitrogen


 


 


 


 18 mg/dL


(8-26)


 


Creatinine


 


 


 


 1.3 mg/dL


(0.7-1.3)


 


Estimated GFR


(Cockcroft-Gault) 


 


 


 60.0 





 


Glucose Level


 


 


 


 340 mg/dL


(70-99)


 


Calcium Level


 


 


 


 9.0 mg/dL


(8.5-10.1)


 


Test


 10/20/18


08:08 10/20/18


11:35 10/20/18


16:44 10/20/18


17:04


 


Glucose (Fingerstick)


 310 mg/dL


(70-99) 200 mg/dL


(70-99) 45 mg/dL


(70-99) 104 mg/dL


(70-99)


 


Test


 10/20/18


21:06 10/21/18


07:28 10/21/18


11:07 





 


Glucose (Fingerstick)


 437 mg/dL


(70-99) 390 mg/dL


(70-99) 292 mg/dL


(70-99) 











Laboratory Tests








Test


 10/20/18


16:44 10/20/18


17:04 10/20/18


21:06 10/21/18


07:28


 


Glucose (Fingerstick)


 45 mg/dL


(70-99) 104 mg/dL


(70-99) 437 mg/dL


(70-99) 390 mg/dL


(70-99)


 


Test


 10/21/18


11:07 


 


 





 


Glucose (Fingerstick)


 292 mg/dL


(70-99) 


 


 











Microbiology


10/14/18 Blood Culture - Final, Complete


           NO GROWTH AFTER 5 DAYS


10/16/18 Anaerobic/Aerobic Culture - Final, Complete


           


10/16/18 Anaerobic Culture Result 1 (ERIC) - Final, Complete


           


10/16/18 Aerobic Culture - Final, Complete


           


10/16/18 Aerobic Culture Result 1 (ERIC) - Final, Complete


           


10/16/18 Antimicrobic Susceptibility - Final, Complete


           


10/16/18 Gram Stain - Final, Complete


           


10/16/18 Gram Stain Result 1 (ERIC) - Final, Complete


           


10/16/18 Gram Stain Result 2 (ERIC) - Final, Complete


Medications





Current Medications


Fentanyl Citrate (Fentanyl 2ml Vial) 25 mcg PRN Q15MIN  PRN IV PAIN GREATER 

THAN 3/10 Last administered on 10/14/18at 14:14;  Start 10/14/18 at 10:30;  

Stop 10/15/18 at 10:29;  Status DC


Sodium Chloride 1,000 ml @  1,000 mls/hr Q1H IV  Last administered on 10/14/

18at 10:39;  Start 10/14/18 at 10:22;  Stop 10/14/18 at 11:21;  Status DC


Ondansetron HCl (Zofran) 4 mg 1X  ONCE IV  Last administered on 10/14/18at 10:45

;  Start 10/14/18 at 11:00;  Stop 10/14/18 at 11:01;  Status DC


Vancomycin HCl 250 ml @  250 mls/hr 1X  ONCE IV ;  Start 10/14/18 at 10:30;  

Stop 10/14/18 at 10:31;  Status DC


Piperacillin Sod/ Tazobactam Sod 3.375 gm/Sodium Chloride 50 ml @  100 mls/hr 

1X  ONCE IV  Last administered on 10/14/18at 11:04;  Start 10/14/18 at 11:00;  

Stop 10/14/18 at 11:29;  Status DC


Vancomycin HCl 1.75 gm/Sodium Chloride 500 ml @  250 mls/hr 1X  ONCE IV  Last 

administered on 10/14/18at 11:05;  Start 10/14/18 at 11:30;  Stop 10/14/18 at 13

:29;  Status DC


Iohexol (Omnipaque 300 Mg/ml) 75 ml 1X  ONCE IV ;  Start 10/14/18 at 11:45;  

Stop 10/14/18 at 11:48;  Status DC


Info (CONTRAST GIVEN -- Rx MONITORING) 1 each PRN DAILY  PRN MC SEE COMMENTS;  

Start 10/14/18 at 12:00;  Stop 10/16/18 at 11:59;  Status DC


Ondansetron HCl (Zofran) 4 mg PRN Q8HRS  PRN IV NAUSEA/VOMITING;  Start 10/14/

18 at 12:45;  Stop 10/14/18 at 12:55;  Status DC


Morphine Sulfate (Morphine Sulfate) 4 mg PRN Q2HR  PRN IV PAIN Last 

administered on 10/14/18at 17:32;  Start 10/14/18 at 12:45;  Stop 10/15/18 at 12

:44;  Status DC


Sodium Chloride 1,000 ml @  75 mls/hr [id number] IV  Last administered on 10/15/

18at 05:52;  Start 10/14/18 at 12:32;  Stop 10/15/18 at 12:31;  Status DC


Ondansetron HCl (Zofran) 4 mg PRN Q6HRS  PRN IV NAUSEA/VOMITING Last 

administered on 10/16/18at 07:08;  Start 10/14/18 at 13:00


Celecoxib (CeleBREX) 100 mg BID PO  Last administered on 10/21/18at 07:54;  

Start 10/14/18 at 21:00


Acetaminophen/ Hydrocodone Bitart (Lortab 5/325) 1 tab PRN Q4HRS  PRN PO 

MODERATE-SEVERE PAIN Last administered on 10/20/18at 22:24;  Start 10/14/18 at 

13:00


Diphenhydramine HCl (Benadryl) 25 mg PRN QHS  PRN PO INSOMNIA Last administered 

on 10/15/18at 21:54;  Start 10/14/18 at 13:00


Insulin Human Lispro (HumaLOG) 0-9 UNITS TIDWMEALS SQ  Last administered on 10/

21/18at 12:02;  Start 10/14/18 at 17:00


Dextrose (Dextrose 50%-Water Syringe) 12.5 gm PRN Q15MIN  PRN IV SEE COMMENTS;  

Start 10/14/18 at 13:00


Insulin Glargine (Lantus) 18 units QHS SQ  Last administered on 10/14/18at 20:57

;  Start 10/14/18 at 21:00;  Stop 10/15/18 at 10:13;  Status DC


Insulin Human Lispro (HumaLOG) 15 units TIDWMEALS SQ  Last administered on 10/15

/18at 09:19;  Start 10/14/18 at 17:00;  Stop 10/15/18 at 10:13;  Status DC


Acetaminophen/ Hydrocodone Bitart (Lortab 5/325) 1 tab PRN Q6HRS  PRN PO PAIN;  

Start 10/14/18 at 13:00;  Status UNV


Clindamycin Phosphate 50 ml @  100 mls/hr Q8HRS IV  Last administered on 10/15/

18at 05:52;  Start 10/14/18 at 22:00;  Stop 10/15/18 at 11:28;  Status DC


Acetaminophen (Tylenol) 650 mg PRN Q6HRS  PRN PO FEVER Last administered on 10/

15/18at 17:57;  Start 10/14/18 at 21:15


Docusate Sodium (Colace) 100 mg BID PO  Last administered on 10/21/18at 07:54;  

Start 10/14/18 at 22:00


Polyethylene Glycol (miraLAX PACKET) 17 gm DAILY PO  Last administered on 10/15/

18at 09:09;  Start 10/14/18 at 22:00


Magnesium Hydroxide (Milk Of Magnesia) 2,400 mg PRN DAILY  PRN PO CONSTIPATION;

  Start 10/14/18 at 21:15


Influenza Virus Vaccine (Afluria Trivalent [id number] Syringe) 0.5 ml ONCE ONCE 

VAX IM ;  Start 10/15/18 at 09:00;  Stop 10/15/18 at 09:01;  Status DC


Insulin Glargine (Lantus) 22 units QHS SQ ;  Start 10/15/18 at 21:00;  Stop 10/

15/18 at 21:00;  Status DC


Insulin Human Lispro (HumaLOG) 20 units TIDWMEALS SQ  Last administered on 10/17

/18at 08:44;  Start 10/15/18 at 12:00;  Stop 10/17/18 at 09:39;  Status DC


Insulin Glargine (Lantus) 18 units QHS SQ  Last administered on 10/20/18at 21:50

;  Start 10/15/18 at 21:00


Piperacillin Sod/ Tazobactam Sod 3.375 gm/Sodium Chloride 50 ml @  100 mls/hr 

Q6HRS IV  Last administered on 10/21/18at 11:58;  Start 10/15/18 at 12:00


Linezolid (Zyvox) 600 mg BID PO  Last administered on 10/20/18at 08:11;  Start 

10/15/18 at 12:00;  Stop 10/20/18 at 13:38;  Status DC


Lactobacillus Rhamnosus (Culturelle) 1 cap BID PO  Last administered on 10/21/

18at 07:54;  Start 10/15/18 at 12:00


Ondansetron HCl (Zofran) 4 mg PRN Q6HRS  PRN IV NAUSEA/VOMITING;  Start 10/16/

18 at 07:00;  Stop 10/17/18 at 06:59;  Status DC


Fentanyl Citrate (Fentanyl 2ml Vial) 25 mcg PRN Q5MIN  PRN IV MILD PAIN;  Start 

10/16/18 at 07:00;  Stop 10/17/18 at 06:59;  Status DC


Fentanyl Citrate (Fentanyl 2ml Vial) 50 mcg PRN Q5MIN  PRN IV MODERATE TO 

SEVERE PAIN Last administered on 10/16/18at 16:03;  Start 10/16/18 at 07:00;  

Stop 10/17/18 at 06:59;  Status DC


Morphine Sulfate (Morphine Sulfate) 1 mg PRN Q10MIN  PRN IV SEVERE PAIN;  Start 

10/16/18 at 07:00;  Stop 10/17/18 at 06:59;  Status DC


Ringer's Solution 1,000 ml @  30 mls/hr Q24H IV  Last administered on 10/16/

18at 12:18;  Start 10/16/18 at 07:00;  Stop 10/16/18 at 18:59;  Status DC


Lidocaine HCl (Xylocaine-Mpf 1% 2ml Vial) 2 ml PRN 1X  PRN ID PRIOR TO IV START

;  Start 10/16/18 at 07:00;  Stop 10/17/18 at 06:59;  Status DC


Hydromorphone HCl (Dilaudid) 0.5 mg PRN Q10MIN  PRN IV SEV PAIN, Second choice;

  Start 10/16/18 at 07:00;  Stop 10/17/18 at 06:59;  Status DC


Prochlorperazine Edisylate (Compazine) 5 mg PACU PRN  PRN IV NAUSEA, MRX1;  

Start 10/16/18 at 07:00;  Stop 10/17/18 at 06:59;  Status DC


Morphine Sulfate (Morphine Sulfate) 2 mg PRN Q2HR  PRN IV MODERATE TO SEVERE 

PAIN Last administered on 10/16/18at 21:13;  Start 10/16/18 at 10:45


Propofol 20 ml @ As Directed STK-MED ONCE IV ;  Start 10/16/18 at 11:55;  Stop 

10/16/18 at 11:56;  Status DC


Dexamethasone Sodium Phosphate (Decadron) 20 mg STK-MED ONCE .ROUTE ;  Start 10/

16/18 at 11:55;  Stop 10/16/18 at 11:56;  Status DC


Lidocaine HCl (Lidocaine Pf 2% Vial) 5 ml STK-MED ONCE .ROUTE ;  Start 10/16/18 

at 11:55;  Stop 10/16/18 at 11:56;  Status DC


Ondansetron HCl (Zofran) 4 mg STK-MED ONCE .ROUTE ;  Start 10/16/18 at 11:55;  

Stop 10/16/18 at 11:56;  Status DC


Fentanyl Citrate (Fentanyl 2ml Vial) 100 mcg STK-MED ONCE .ROUTE ;  Start 10/16/

18 at 11:55;  Stop 10/16/18 at 11:56;  Status DC


Midazolam HCl (Versed) 2 mg STK-MED ONCE .ROUTE ;  Start 10/16/18 at 12:00;  

Stop 10/16/18 at 12:01;  Status DC


Bupivacaine HCl/ Epinephrine Bitart (Sensorcain-Mpf Epi 0.5%-1:[id number]) 30 ml STK

-MED ONCE .ROUTE  Last administered on 10/16/18at 13:49;  Start 10/16/18 at 12:

26;  Stop 10/16/18 at 13:27;  Status DC


Esmolol HCl (Brevibloc) 100 mg STK-MED ONCE IV ;  Start 10/16/18 at 13:41;  

Stop 10/16/18 at 13:42;  Status DC


Cellulose (Surgicel Hemostat 2x3) 1 each STK-MED ONCE .ROUTE  Last administered 

on 10/16/18at 14:03;  Start 10/16/18 at 12:48;  Stop 10/16/18 at 13:48;  Status 

DC


Sevoflurane (Ultane) 30 ml STK-MED ONCE IH ;  Start 10/16/18 at 13:51;  Stop 10/

16/18 at 13:52;  Status DC


Epinephrine HCl (Adrenalin) 30 mg STK-MED ONCE .ROUTE  Last administered on 10/

16/18at 14:04;  Start 10/16/18 at 12:57;  Stop 10/16/18 at 13:57;  Status DC


Epinephrine HCl (EPINEPHrine SYRINGE) 1 mg STK-MED ONCE .ROUTE ;  Start 10/16/

18 at 13:59;  Stop 10/16/18 at 14:00;  Status DC


Albuterol Sulfate (Ventolin Neb Soln) 2.5 mg 1X  ONCE NEB  Last administered on 

10/16/18at 14:32;  Start 10/16/18 at 14:30;  Stop 10/16/18 at 14:31;  Status DC


Furosemide (Lasix) 10 mg 1X  ONCE IVP  Last administered on 10/16/18at 14:43;  

Start 10/16/18 at 14:45;  Stop 10/16/18 at 14:46;  Status DC


Furosemide (Lasix) 10 mg 1X  ONCE IVP ;  Start 10/16/18 at 15:30;  Stop 10/16/

18 at 15:33;  Status DC


Insulin Human Lispro (HumaLOG VIAL) 3 unit 1X  ONCE SQ  Last administered on 10/

16/18at 15:34;  Start 10/16/18 at 15:30;  Stop 10/16/18 at 15:33;  Status DC


Insulin Human Lispro (HumaLOG) 25 units TIDWMEALS SQ  Last administered on 10/18

/18at 08:44;  Start 10/17/18 at 12:00;  Stop 10/18/18 at 08:46;  Status DC


Insulin Human Lispro (HumaLOG) 20 units TIDWMEALS SQ  Last administered on 10/18

/18at 12:26;  Start 10/18/18 at 12:00;  Stop 10/19/18 at 07:51;  Status DC


Insulin Glargine (Lantus) 15 units 1X  ONCE SQ ;  Start 10/18/18 at 09:30;  

Stop 10/18/18 at 09:31;  Status DC


Enoxaparin Sodium (Lovenox 40mg Syringe) 40 mg Q24H SQ ;  Start 10/18/18 at 14:

00


Tramadol HCl (Ultram) 50 mg PRN Q6HRS  PRN PO MILD TO MODERATE PAIN;  Start 10/

18/18 at 13:00


Acetaminophen (Tylenol) 650 mg PRN Q6HRS  PRN PO MILD PAIN / TEMP;  Start 10/18/

18 at 13:00;  Status UNV


Insulin Human Lispro (HumaLOG) 15 units 1X  ONCE SQ  Last administered on 10/18/

18at 21:26;  Start 10/18/18 at 21:30;  Stop 10/18/18 at 21:31;  Status DC


Insulin Human Lispro (HumaLOG) 15 units TIDWMEALS SQ  Last administered on 10/19

/18at 17:47;  Start 10/19/18 at 08:00;  Stop 10/20/18 at 07:47;  Status DC


Insulin Human Lispro (HumaLOG) 12 units TIDWMEALS SQ  Last administered on 10/21

/18at 12:02;  Start 10/20/18 at 08:00


Lisinopril (Prinivil) 40 mg DAILY PO  Last administered on 10/21/18at 07:55;  

Start 10/20/18 at 10:00


Insulin Human Lispro (HumaLOG) 15 units 1X  ONCE SQ  Last administered on 10/20/

18at 21:51;  Start 10/20/18 at 21:30;  Stop 10/20/18 at 21:34;  Status DC





Active Scripts


Active


Norco 5-325 Tablet (Acetaminophen/Hydrocodone Bitart) 1 Each Tablet 1 Tab PO 

PRN Q6HRS PRN


Bactrim Ds Tablet (Sulfamethoxazole/Trimethoprim) 1 Each Tablet 1 Tab PO BID


Reported


Humalog (Insulin Lispro) 100 Unit/1 Ml Cartridge 100 Unit SQ 


Lantus Solostar (Insulin Glargine,Hum.rec.anlog) 100 Unit/1 Ml Insuln.pen 10 

Unit SQ QHS


Vitals/I & O





Vital Sign - Last 24 Hours








 10/20/18 10/20/18 10/20/18 10/20/18





 15:00 18:22 19:00 20:08


 


Temp 97.5  97.9 





 97.5  97.9 


 


Pulse 81  90 


 


Resp 20  20 


 


B/P (MAP) 135/82 (99)  136/78 (97) 


 


Pulse Ox 98  99 


 


O2 Delivery Room Air Room Air Room Air Room Air


 


    





    





 10/20/18 10/20/18 10/20/18 10/21/18





 22:24 23:00 23:24 03:00


 


Temp  97.8  97.8





  97.8  97.8


 


Pulse  70  70


 


Resp  20  20


 


B/P (MAP)  125/60 (81)  125/60 (81)


 


Pulse Ox 98 96 96 96


 


O2 Delivery Room Air Room Air Room Air Room Air


 


    





    





 10/21/18 10/21/18 10/21/18 10/21/18





 07:00 07:55 08:00 11:00


 


Temp 97.5   97.7





 97.5   97.7


 


Pulse 74 74  86


 


Resp 20   18


 


B/P (MAP) 121/57 (78) 121/57  154/86 (108)


 


Pulse Ox 97   94


 


O2 Delivery Room Air  Room Air Room Air














Intake and Output   


 


 10/20/18 10/20/18 10/21/18





 15:00 23:00 07:00


 


Intake Total 50 ml 650 ml 1060 ml


 


Balance 50 ml 650 ml 1060 ml

















KELLI MARTINEZ MD Oct 21, 2018 12:06

## 2018-10-21 NOTE — PDOC
Infectious Disease Note


Subjective


Subjective


Hoping to go home soon


Pain controlled


No F/C/S/N/V/D/SOA/Rash





ROS


ROS


per HPI otherwise neg





Vital Sign


Vital Signs





Vital Signs








  Date Time  Temp Pulse Resp B/P (MAP) Pulse Ox O2 Delivery O2 Flow Rate FiO2


 


10/21/18 08:00      Room Air  


 


10/21/18 07:55  74  121/57    


 


10/21/18 07:00 97.5  20  97   





 97.5       











Physical Exam


PHYSICAL EXAM


GENERAL: Lying on right side, alert, relaxed appearance 


HEENT:  Oral cavity, pharynx clear.


NECK:  Supple with good range of motion.


LUNGS:  Clear to auscultation bilaterally.


HEART:  S1, S2.


ABDOMEN:  Soft, nontender, nondistended with positive bowel sounds.


EXTREMITIES:  Without clubbing, cyanosis or gross edema.


SKIN:  Warm to touch without signs of generalized rash. Lisa-rectal Penrose in 

place, + drainage. no area redness or induration


CNS: Alert, responds appropriately





Labs


Lab





Laboratory Tests








Test


 10/20/18


11:35 10/20/18


16:44 10/20/18


17:04 10/20/18


21:06


 


Glucose (Fingerstick)


 200 mg/dL


(70-99) 45 mg/dL


(70-99) 104 mg/dL


(70-99) 437 mg/dL


(70-99)


 


Test


 10/21/18


07:28 


 


 





 


Glucose (Fingerstick)


 390 mg/dL


(70-99) 


 


 











Micro


10/14/18 Blood Culture - Final, Complete


           NO GROWTH AFTER 5 DAYS











 ANAEROBIC RES 1  Final  


        No anaerobic growth in 72 hours.








 AEROBIC RES 1  Final  


        Staphylococcus aureus





       


I


                    MICS are expressed in micrograms per mL


           Antibiotic                 RSLT#1    RSLT#2    


        Ciprofloxacin                  S<=0.5


        Clindamycin                    S<=0.25


        Erythromycin                   R>=8


        Gentamicin                     S<=0.5


        Levofloxacin                   S<=0.12


        Linezolid                      S =2


        Moxifloxacin                   S<=0.25


        Oxacillin                      S =1


        Penicillin                     R>=0.5


        Quinupristin/Dalfopristin      S<=0.25


        Rifampin                       S<=0.5


        Tetracycline                   S<=1


        Trimethoprim/Sulfa             S<=10


        Vancomycin                     S<=0.5





Objective


Assessment


Leukocytosis now s/p Dexamethasone 10/16 - improved 


Perirectal infection - s/p I and D 10/16 - MSSA


Transaminitis  improved


Fever - resolved


DM





Plan


Plan of Care


can dc from id standpoint on augmentin


penrose drain management per surgery


Local wound care


maintain hygiene


f/u with us in 2 weeks





D/W RN





Patient seen, examined,Labs and data reviewed  I agree with above plan











DORIS ROCHA Oct 21, 2018 11:06


LEXA LEE MD Oct 21, 2018 13:42

## 2018-10-21 NOTE — PDOC3
Discharge Summary [facility]


Date of Admission:  Oct 14, 2018


Discharge Date:  Oct 21, 2018


Admitting Diagnosis


 


Left perineal cellulitis/abscess s/p i and d  10/17, MSSA


Sepsis  


Diabetes 1, insulin req.  with  hemoglobin A1c -  8.6


rectal pain, cannot void


HTN


Final Diagnosis





CONSULTS


id


sx


Brief Hospital Course


Mr. Phelps  is a 43 old M, dm2, came for rectal pain. 


CT no abscess, but pt has severe pain cannot have bm. 


got I AND D on 10/17, with a penrose drain since then, has some drainage daily.


wbc down to normal, wound cx + MSSA. was on zosyn, ok to dc home with augmentin 

x2weeks as per ID.


dc home , plus lisinopril.


resume home insulin regimen. pt's glucose was fluctuating and he refused 

insulin from time to time.


 dc time 35min.


Patient History:  


Diabetes mellitus type II


  G8 BROTHER


  G8 SON


FH: hypertension


  32 MOTHER


Disposition


home


CONDITION AT DISCHARGE:  Improved


Scheduled


Insulin Glargine,Hum.rec.anlog (Lantus Solostar), 10 UNIT SQ QHS, (Reported)


Lisinopril (Lisinopril), 40 MG PO DAILY





Scheduled PRN


Hydrocodone Bit/Acetaminophen (Hydrocodone-Apap 5-325  **), 1 TAB PO PRN Q4HRS 

PRN for MODERATE-SEVERE PAIN





Miscellaneous Medications


Insulin Lispro (Humalog), 100 UNIT SQ, (Reported)





Discontinued Medications


Hydrocodone/Apap 5-325 (Norco 5-325 Tablet), 1 TAB PO PRN Q6HRS PRN for PAIN


Sulfamethoxazole/Trimethoprim (Bactrim Ds Tablet), 1 TAB PO BID











KELLI MARTINEZ MD Oct 21, 2018 14:06